# Patient Record
Sex: MALE | Race: WHITE | URBAN - METROPOLITAN AREA
[De-identification: names, ages, dates, MRNs, and addresses within clinical notes are randomized per-mention and may not be internally consistent; named-entity substitution may affect disease eponyms.]

---

## 2024-10-08 ENCOUNTER — HOSPITAL ENCOUNTER (EMERGENCY)
Age: 54
Discharge: LEFT BEFORE DISCHARGE | End: 2024-10-08

## 2024-10-08 VITALS
RESPIRATION RATE: 16 BRPM | HEIGHT: 69 IN | TEMPERATURE: 97.5 F | WEIGHT: 240 LBS | SYSTOLIC BLOOD PRESSURE: 198 MMHG | OXYGEN SATURATION: 96 % | DIASTOLIC BLOOD PRESSURE: 99 MMHG | BODY MASS INDEX: 35.55 KG/M2 | HEART RATE: 86 BPM

## 2024-10-08 DIAGNOSIS — R10.9 LEFT FLANK PAIN: Primary | ICD-10-CM

## 2024-10-08 PROCEDURE — 99281 EMR DPT VST MAYX REQ PHY/QHP: CPT

## 2024-10-08 RX ORDER — 0.9 % SODIUM CHLORIDE 0.9 %
1000 INTRAVENOUS SOLUTION INTRAVENOUS ONCE
Status: DISCONTINUED | OUTPATIENT
Start: 2024-10-08 | End: 2024-10-08 | Stop reason: HOSPADM

## 2024-10-08 RX ORDER — OXYCODONE AND ACETAMINOPHEN 5; 325 MG/1; MG/1
1 TABLET ORAL ONCE
Status: DISCONTINUED | OUTPATIENT
Start: 2024-10-08 | End: 2024-10-08 | Stop reason: HOSPADM

## 2024-10-08 RX ORDER — ONDANSETRON 2 MG/ML
4 INJECTION INTRAMUSCULAR; INTRAVENOUS ONCE
Status: DISCONTINUED | OUTPATIENT
Start: 2024-10-08 | End: 2024-10-08 | Stop reason: HOSPADM

## 2024-10-08 RX ORDER — KETOROLAC TROMETHAMINE 30 MG/ML
15 INJECTION, SOLUTION INTRAMUSCULAR; INTRAVENOUS ONCE
Status: DISCONTINUED | OUTPATIENT
Start: 2024-10-08 | End: 2024-10-08 | Stop reason: HOSPADM

## 2024-10-08 ASSESSMENT — LIFESTYLE VARIABLES
HOW OFTEN DO YOU HAVE A DRINK CONTAINING ALCOHOL: NEVER
HOW MANY STANDARD DRINKS CONTAINING ALCOHOL DO YOU HAVE ON A TYPICAL DAY: PATIENT DOES NOT DRINK

## 2024-10-08 NOTE — ED PROVIDER NOTES
Independent  HPI:  10/8/24, Time: 7:23 PM EDT         Leander Ahmadi is a 54 y.o. male presenting to the ED for left flank pain.  Patient presents to emergency department states that on Saturday he began to have left flank pain.  Reports high concern for kidney stone.  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  Patient is concerned because now he only has 1 kidney.  He states that on Saturday he did have nausea and vomiting.  He reports that then Sunday he began to feel a little bit better but then Monday once again symptoms are reoccurring.  He states the pain is primarily left flank traveling down the left lumbar and not quite into the abdomen yet.  He does report feeling very nauseous.  No blood noted in his urine.  He does report feeling feverish but no documented temperature.  Patient denies chest pain denies shortness of breath.    Review of Systems:   A complete review of systems was performed and pertinent positives and negatives are stated within HPI, all other systems reviewed and are negative.          --------------------------------------------- PAST HISTORY ---------------------------------------------  Past Medical History:  has no past medical history on file.    Past Surgical History:  has no past surgical history on file.    Social History:      Family History: family history is not on file.     The patient’s home medications have been reviewed.    Allergies: Patient has no allergy information on record.    -------------------------------------------------- RESULTS -------------------------------------------------  All laboratory and radiology results have been personally reviewed by myself   LABS:  No results found for this visit on 10/08/24.    RADIOLOGY:  Interpreted by Radiologist.  CT ABDOMEN PELVIS WO CONTRAST Additional Contrast? None    (Results Pending)       ------------------------- NURSING

## 2024-10-09 ENCOUNTER — HOSPITAL ENCOUNTER (INPATIENT)
Facility: HOSPITAL | Age: 54
LOS: 1 days | Discharge: HOME | End: 2024-10-10
Attending: EMERGENCY MEDICINE | Admitting: FAMILY MEDICINE
Payer: COMMERCIAL

## 2024-10-09 ENCOUNTER — APPOINTMENT (OUTPATIENT)
Dept: RADIOLOGY | Facility: HOSPITAL | Age: 54
End: 2024-10-09
Payer: COMMERCIAL

## 2024-10-09 ENCOUNTER — ANESTHESIA EVENT (OUTPATIENT)
Dept: OPERATING ROOM | Facility: HOSPITAL | Age: 54
End: 2024-10-09
Payer: COMMERCIAL

## 2024-10-09 ENCOUNTER — APPOINTMENT (OUTPATIENT)
Dept: CARDIOLOGY | Facility: HOSPITAL | Age: 54
End: 2024-10-09
Payer: COMMERCIAL

## 2024-10-09 ENCOUNTER — ANESTHESIA (OUTPATIENT)
Dept: OPERATING ROOM | Facility: HOSPITAL | Age: 54
End: 2024-10-09
Payer: COMMERCIAL

## 2024-10-09 DIAGNOSIS — N17.9 AKI (ACUTE KIDNEY INJURY) (CMS-HCC): ICD-10-CM

## 2024-10-09 DIAGNOSIS — I10 PRIMARY HYPERTENSION: ICD-10-CM

## 2024-10-09 DIAGNOSIS — R10.9 FLANK PAIN: Primary | ICD-10-CM

## 2024-10-09 DIAGNOSIS — N20.0 KIDNEY STONE ON LEFT SIDE: ICD-10-CM

## 2024-10-09 DIAGNOSIS — N20.1 LEFT URETERAL STONE: ICD-10-CM

## 2024-10-09 DIAGNOSIS — I10 ACCELERATED HYPERTENSION: ICD-10-CM

## 2024-10-09 LAB
ANION GAP SERPL CALC-SCNC: 12 MMOL/L (ref 10–20)
ANION GAP SERPL CALC-SCNC: 13 MMOL/L (ref 10–20)
APPEARANCE UR: CLEAR
ATRIAL RATE: 72 BPM
BASOPHILS # BLD AUTO: 0.06 X10*3/UL (ref 0–0.1)
BASOPHILS NFR BLD AUTO: 0.6 %
BILIRUB UR STRIP.AUTO-MCNC: NEGATIVE MG/DL
BUN SERPL-MCNC: 19 MG/DL (ref 6–23)
BUN SERPL-MCNC: 20 MG/DL (ref 6–23)
CALCIUM SERPL-MCNC: 8.7 MG/DL (ref 8.6–10.3)
CALCIUM SERPL-MCNC: 9.3 MG/DL (ref 8.6–10.3)
CHLORIDE SERPL-SCNC: 100 MMOL/L (ref 98–107)
CHLORIDE SERPL-SCNC: 106 MMOL/L (ref 98–107)
CO2 SERPL-SCNC: 22 MMOL/L (ref 21–32)
CO2 SERPL-SCNC: 29 MMOL/L (ref 21–32)
COLOR UR: ABNORMAL
CREAT SERPL-MCNC: 2.17 MG/DL (ref 0.5–1.3)
CREAT SERPL-MCNC: 2.17 MG/DL (ref 0.5–1.3)
EGFRCR SERPLBLD CKD-EPI 2021: 35 ML/MIN/1.73M*2
EGFRCR SERPLBLD CKD-EPI 2021: 35 ML/MIN/1.73M*2
EOSINOPHIL # BLD AUTO: 0.09 X10*3/UL (ref 0–0.7)
EOSINOPHIL NFR BLD AUTO: 0.9 %
ERYTHROCYTE [DISTWIDTH] IN BLOOD BY AUTOMATED COUNT: 12.2 % (ref 11.5–14.5)
GLUCOSE SERPL-MCNC: 150 MG/DL (ref 74–99)
GLUCOSE SERPL-MCNC: 96 MG/DL (ref 74–99)
GLUCOSE UR STRIP.AUTO-MCNC: NORMAL MG/DL
HCT VFR BLD AUTO: 46.5 % (ref 41–52)
HGB BLD-MCNC: 16 G/DL (ref 13.5–17.5)
HOLD SPECIMEN: NORMAL
IMM GRANULOCYTES # BLD AUTO: 0.04 X10*3/UL (ref 0–0.7)
IMM GRANULOCYTES NFR BLD AUTO: 0.4 % (ref 0–0.9)
KETONES UR STRIP.AUTO-MCNC: NEGATIVE MG/DL
LEUKOCYTE ESTERASE UR QL STRIP.AUTO: NEGATIVE
LYMPHOCYTES # BLD AUTO: 1.08 X10*3/UL (ref 1.2–4.8)
LYMPHOCYTES NFR BLD AUTO: 11.1 %
MCH RBC QN AUTO: 31.7 PG (ref 26–34)
MCHC RBC AUTO-ENTMCNC: 34.4 G/DL (ref 32–36)
MCV RBC AUTO: 92 FL (ref 80–100)
MONOCYTES # BLD AUTO: 0.68 X10*3/UL (ref 0.1–1)
MONOCYTES NFR BLD AUTO: 7 %
MUCOUS THREADS #/AREA URNS AUTO: ABNORMAL /LPF
NEUTROPHILS # BLD AUTO: 7.74 X10*3/UL (ref 1.2–7.7)
NEUTROPHILS NFR BLD AUTO: 80 %
NITRITE UR QL STRIP.AUTO: NEGATIVE
NRBC BLD-RTO: 0 /100 WBCS (ref 0–0)
P AXIS: -3 DEGREES
P OFFSET: 213 MS
P ONSET: 161 MS
PH UR STRIP.AUTO: 6.5 [PH]
PLATELET # BLD AUTO: 297 X10*3/UL (ref 150–450)
POTASSIUM SERPL-SCNC: 4.1 MMOL/L (ref 3.5–5.3)
POTASSIUM SERPL-SCNC: 4.5 MMOL/L (ref 3.5–5.3)
PR INTERVAL: 128 MS
PROT UR STRIP.AUTO-MCNC: NEGATIVE MG/DL
Q ONSET: 225 MS
QRS COUNT: 12 BEATS
QRS DURATION: 76 MS
QT INTERVAL: 406 MS
QTC CALCULATION(BAZETT): 444 MS
QTC FREDERICIA: 431 MS
R AXIS: 5 DEGREES
RBC # BLD AUTO: 5.04 X10*6/UL (ref 4.5–5.9)
RBC # UR STRIP.AUTO: ABNORMAL /UL
RBC #/AREA URNS AUTO: >20 /HPF
SODIUM SERPL-SCNC: 136 MMOL/L (ref 136–145)
SODIUM SERPL-SCNC: 137 MMOL/L (ref 136–145)
SP GR UR STRIP.AUTO: 1.02
T AXIS: 187 DEGREES
T OFFSET: 428 MS
UROBILINOGEN UR STRIP.AUTO-MCNC: NORMAL MG/DL
VENTRICULAR RATE: 72 BPM
WBC # BLD AUTO: 9.7 X10*3/UL (ref 4.4–11.3)
WBC #/AREA URNS AUTO: ABNORMAL /HPF

## 2024-10-09 PROCEDURE — 96361 HYDRATE IV INFUSION ADD-ON: CPT

## 2024-10-09 PROCEDURE — 7100000001 HC RECOVERY ROOM TIME - INITIAL BASE CHARGE: Performed by: STUDENT IN AN ORGANIZED HEALTH CARE EDUCATION/TRAINING PROGRAM

## 2024-10-09 PROCEDURE — 36415 COLL VENOUS BLD VENIPUNCTURE: CPT | Performed by: FAMILY MEDICINE

## 2024-10-09 PROCEDURE — 2500000004 HC RX 250 GENERAL PHARMACY W/ HCPCS (ALT 636 FOR OP/ED): Performed by: FAMILY MEDICINE

## 2024-10-09 PROCEDURE — 2500000004 HC RX 250 GENERAL PHARMACY W/ HCPCS (ALT 636 FOR OP/ED): Performed by: NURSE ANESTHETIST, CERTIFIED REGISTERED

## 2024-10-09 PROCEDURE — 74176 CT ABD & PELVIS W/O CONTRAST: CPT | Performed by: RADIOLOGY

## 2024-10-09 PROCEDURE — 96375 TX/PRO/DX INJ NEW DRUG ADDON: CPT

## 2024-10-09 PROCEDURE — C1769 GUIDE WIRE: HCPCS | Performed by: STUDENT IN AN ORGANIZED HEALTH CARE EDUCATION/TRAINING PROGRAM

## 2024-10-09 PROCEDURE — 81001 URINALYSIS AUTO W/SCOPE: CPT | Performed by: EMERGENCY MEDICINE

## 2024-10-09 PROCEDURE — 2550000001 HC RX 255 CONTRASTS: Performed by: STUDENT IN AN ORGANIZED HEALTH CARE EDUCATION/TRAINING PROGRAM

## 2024-10-09 PROCEDURE — 36415 COLL VENOUS BLD VENIPUNCTURE: CPT | Performed by: EMERGENCY MEDICINE

## 2024-10-09 PROCEDURE — 74420 UROGRAPHY RTRGR +-KUB: CPT

## 2024-10-09 PROCEDURE — 0T778DZ DILATION OF LEFT URETER WITH INTRALUMINAL DEVICE, VIA NATURAL OR ARTIFICIAL OPENING ENDOSCOPIC: ICD-10-PCS | Performed by: STUDENT IN AN ORGANIZED HEALTH CARE EDUCATION/TRAINING PROGRAM

## 2024-10-09 PROCEDURE — 2500000001 HC RX 250 WO HCPCS SELF ADMINISTERED DRUGS (ALT 637 FOR MEDICARE OP): Performed by: FAMILY MEDICINE

## 2024-10-09 PROCEDURE — 3600000003 HC OR TIME - INITIAL BASE CHARGE - PROCEDURE LEVEL THREE: Performed by: STUDENT IN AN ORGANIZED HEALTH CARE EDUCATION/TRAINING PROGRAM

## 2024-10-09 PROCEDURE — 99222 1ST HOSP IP/OBS MODERATE 55: CPT | Performed by: FAMILY MEDICINE

## 2024-10-09 PROCEDURE — 2500000001 HC RX 250 WO HCPCS SELF ADMINISTERED DRUGS (ALT 637 FOR MEDICARE OP)

## 2024-10-09 PROCEDURE — 82374 ASSAY BLOOD CARBON DIOXIDE: CPT | Performed by: FAMILY MEDICINE

## 2024-10-09 PROCEDURE — 3600000008 HC OR TIME - EACH INCREMENTAL 1 MINUTE - PROCEDURE LEVEL THREE: Performed by: STUDENT IN AN ORGANIZED HEALTH CARE EDUCATION/TRAINING PROGRAM

## 2024-10-09 PROCEDURE — 96376 TX/PRO/DX INJ SAME DRUG ADON: CPT

## 2024-10-09 PROCEDURE — C2617 STENT, NON-COR, TEM W/O DEL: HCPCS | Performed by: STUDENT IN AN ORGANIZED HEALTH CARE EDUCATION/TRAINING PROGRAM

## 2024-10-09 PROCEDURE — 52352 CYSTOURETERO W/STONE REMOVE: CPT | Performed by: STUDENT IN AN ORGANIZED HEALTH CARE EDUCATION/TRAINING PROGRAM

## 2024-10-09 PROCEDURE — 74176 CT ABD & PELVIS W/O CONTRAST: CPT

## 2024-10-09 PROCEDURE — 83036 HEMOGLOBIN GLYCOSYLATED A1C: CPT | Mod: GEALAB | Performed by: FAMILY MEDICINE

## 2024-10-09 PROCEDURE — 96374 THER/PROPH/DIAG INJ IV PUSH: CPT | Mod: 59

## 2024-10-09 PROCEDURE — 2720000007 HC OR 272 NO HCPCS: Performed by: STUDENT IN AN ORGANIZED HEALTH CARE EDUCATION/TRAINING PROGRAM

## 2024-10-09 PROCEDURE — 2500000004 HC RX 250 GENERAL PHARMACY W/ HCPCS (ALT 636 FOR OP/ED): Performed by: EMERGENCY MEDICINE

## 2024-10-09 PROCEDURE — 7100000002 HC RECOVERY ROOM TIME - EACH INCREMENTAL 1 MINUTE: Performed by: STUDENT IN AN ORGANIZED HEALTH CARE EDUCATION/TRAINING PROGRAM

## 2024-10-09 PROCEDURE — 99222 1ST HOSP IP/OBS MODERATE 55: CPT | Performed by: NURSE PRACTITIONER

## 2024-10-09 PROCEDURE — 1100000001 HC PRIVATE ROOM DAILY

## 2024-10-09 PROCEDURE — 80048 BASIC METABOLIC PNL TOTAL CA: CPT | Performed by: EMERGENCY MEDICINE

## 2024-10-09 PROCEDURE — 3700000001 HC GENERAL ANESTHESIA TIME - INITIAL BASE CHARGE: Performed by: STUDENT IN AN ORGANIZED HEALTH CARE EDUCATION/TRAINING PROGRAM

## 2024-10-09 PROCEDURE — 2500000004 HC RX 250 GENERAL PHARMACY W/ HCPCS (ALT 636 FOR OP/ED)

## 2024-10-09 PROCEDURE — 85025 COMPLETE CBC W/AUTO DIFF WBC: CPT | Performed by: EMERGENCY MEDICINE

## 2024-10-09 PROCEDURE — 74420 UROGRAPHY RTRGR +-KUB: CPT | Performed by: STUDENT IN AN ORGANIZED HEALTH CARE EDUCATION/TRAINING PROGRAM

## 2024-10-09 PROCEDURE — 99285 EMERGENCY DEPT VISIT HI MDM: CPT | Mod: 25

## 2024-10-09 PROCEDURE — 93005 ELECTROCARDIOGRAM TRACING: CPT

## 2024-10-09 PROCEDURE — 99222 1ST HOSP IP/OBS MODERATE 55: CPT | Performed by: STUDENT IN AN ORGANIZED HEALTH CARE EDUCATION/TRAINING PROGRAM

## 2024-10-09 PROCEDURE — 52332 CYSTOSCOPY AND TREATMENT: CPT | Performed by: STUDENT IN AN ORGANIZED HEALTH CARE EDUCATION/TRAINING PROGRAM

## 2024-10-09 PROCEDURE — 0TC78ZZ EXTIRPATION OF MATTER FROM LEFT URETER, VIA NATURAL OR ARTIFICIAL OPENING ENDOSCOPIC: ICD-10-PCS | Performed by: STUDENT IN AN ORGANIZED HEALTH CARE EDUCATION/TRAINING PROGRAM

## 2024-10-09 PROCEDURE — 2500000005 HC RX 250 GENERAL PHARMACY W/O HCPCS: Performed by: EMERGENCY MEDICINE

## 2024-10-09 PROCEDURE — 3700000002 HC GENERAL ANESTHESIA TIME - EACH INCREMENTAL 1 MINUTE: Performed by: STUDENT IN AN ORGANIZED HEALTH CARE EDUCATION/TRAINING PROGRAM

## 2024-10-09 PROCEDURE — 2780000003 HC OR 278 NO HCPCS: Performed by: STUDENT IN AN ORGANIZED HEALTH CARE EDUCATION/TRAINING PROGRAM

## 2024-10-09 PROCEDURE — BT1F1ZZ FLUOROSCOPY OF LEFT KIDNEY, URETER AND BLADDER USING LOW OSMOLAR CONTRAST: ICD-10-PCS | Performed by: STUDENT IN AN ORGANIZED HEALTH CARE EDUCATION/TRAINING PROGRAM

## 2024-10-09 DEVICE — STENT KIT, IMAJIN HYDRO, 6FR X 26CM, POSITIONER, NO GUIDEWIRE: Type: IMPLANTABLE DEVICE | Site: URETER | Status: FUNCTIONAL

## 2024-10-09 RX ORDER — CEFAZOLIN 1 G/1
INJECTION, POWDER, FOR SOLUTION INTRAVENOUS AS NEEDED
Status: DISCONTINUED | OUTPATIENT
Start: 2024-10-09 | End: 2024-10-09

## 2024-10-09 RX ORDER — TALC
3 POWDER (GRAM) TOPICAL NIGHTLY PRN
Status: DISCONTINUED | OUTPATIENT
Start: 2024-10-09 | End: 2024-10-10 | Stop reason: HOSPADM

## 2024-10-09 RX ORDER — KETOROLAC TROMETHAMINE 15 MG/ML
15 INJECTION, SOLUTION INTRAMUSCULAR; INTRAVENOUS ONCE
Status: COMPLETED | OUTPATIENT
Start: 2024-10-09 | End: 2024-10-09

## 2024-10-09 RX ORDER — PROPOFOL 10 MG/ML
INJECTION, EMULSION INTRAVENOUS CONTINUOUS PRN
Status: DISCONTINUED | OUTPATIENT
Start: 2024-10-09 | End: 2024-10-09

## 2024-10-09 RX ORDER — ENALAPRILAT 1.25 MG/ML
1.25 INJECTION INTRAVENOUS ONCE
Status: COMPLETED | OUTPATIENT
Start: 2024-10-09 | End: 2024-10-09

## 2024-10-09 RX ORDER — MIDAZOLAM HYDROCHLORIDE 1 MG/ML
INJECTION INTRAMUSCULAR; INTRAVENOUS CONTINUOUS PRN
Status: DISCONTINUED | OUTPATIENT
Start: 2024-10-09 | End: 2024-10-09

## 2024-10-09 RX ORDER — AMLODIPINE BESYLATE 10 MG/1
10 TABLET ORAL DAILY
Status: DISCONTINUED | OUTPATIENT
Start: 2024-10-10 | End: 2024-10-10 | Stop reason: HOSPADM

## 2024-10-09 RX ORDER — ACETAMINOPHEN 325 MG/1
650 TABLET ORAL EVERY 6 HOURS PRN
Status: DISCONTINUED | OUTPATIENT
Start: 2024-10-09 | End: 2024-10-10 | Stop reason: HOSPADM

## 2024-10-09 RX ORDER — ACETAMINOPHEN 160 MG/5ML
650 SOLUTION ORAL EVERY 6 HOURS PRN
Status: DISCONTINUED | OUTPATIENT
Start: 2024-10-09 | End: 2024-10-10 | Stop reason: HOSPADM

## 2024-10-09 RX ORDER — ONDANSETRON HYDROCHLORIDE 2 MG/ML
4 INJECTION, SOLUTION INTRAVENOUS EVERY 8 HOURS PRN
Status: DISCONTINUED | OUTPATIENT
Start: 2024-10-09 | End: 2024-10-10 | Stop reason: HOSPADM

## 2024-10-09 RX ORDER — HEPARIN SODIUM 5000 [USP'U]/ML
5000 INJECTION, SOLUTION INTRAVENOUS; SUBCUTANEOUS EVERY 8 HOURS SCHEDULED
Status: DISCONTINUED | OUTPATIENT
Start: 2024-10-09 | End: 2024-10-10 | Stop reason: HOSPADM

## 2024-10-09 RX ORDER — ALUMINUM HYDROXIDE, MAGNESIUM HYDROXIDE, AND SIMETHICONE 1200; 120; 1200 MG/30ML; MG/30ML; MG/30ML
30 SUSPENSION ORAL EVERY 6 HOURS PRN
Status: DISCONTINUED | OUTPATIENT
Start: 2024-10-09 | End: 2024-10-10 | Stop reason: HOSPADM

## 2024-10-09 RX ORDER — ACETAMINOPHEN 650 MG/1
650 SUPPOSITORY RECTAL EVERY 6 HOURS PRN
Status: DISCONTINUED | OUTPATIENT
Start: 2024-10-09 | End: 2024-10-10 | Stop reason: HOSPADM

## 2024-10-09 RX ORDER — ONDANSETRON 4 MG/1
4 TABLET, ORALLY DISINTEGRATING ORAL EVERY 8 HOURS PRN
Status: DISCONTINUED | OUTPATIENT
Start: 2024-10-09 | End: 2024-10-10 | Stop reason: HOSPADM

## 2024-10-09 RX ORDER — LIDOCAINE HCL/PF 100 MG/5ML
SYRINGE (ML) INTRAVENOUS AS NEEDED
Status: DISCONTINUED | OUTPATIENT
Start: 2024-10-09 | End: 2024-10-09

## 2024-10-09 RX ORDER — DOCUSATE SODIUM 100 MG/1
100 CAPSULE, LIQUID FILLED ORAL 2 TIMES DAILY
Status: DISCONTINUED | OUTPATIENT
Start: 2024-10-09 | End: 2024-10-10 | Stop reason: HOSPADM

## 2024-10-09 RX ORDER — ATENOLOL 25 MG/1
12.5 TABLET ORAL DAILY
Status: DISCONTINUED | OUTPATIENT
Start: 2024-10-09 | End: 2024-10-10 | Stop reason: HOSPADM

## 2024-10-09 RX ORDER — FENTANYL CITRATE 50 UG/ML
INJECTION, SOLUTION INTRAMUSCULAR; INTRAVENOUS CONTINUOUS PRN
Status: DISCONTINUED | OUTPATIENT
Start: 2024-10-09 | End: 2024-10-09

## 2024-10-09 RX ORDER — AMLODIPINE BESYLATE 5 MG/1
5 TABLET ORAL ONCE
Status: COMPLETED | OUTPATIENT
Start: 2024-10-09 | End: 2024-10-09

## 2024-10-09 RX ORDER — SODIUM CHLORIDE 9 MG/ML
75 INJECTION, SOLUTION INTRAVENOUS CONTINUOUS
Status: DISCONTINUED | OUTPATIENT
Start: 2024-10-09 | End: 2024-10-10

## 2024-10-09 RX ORDER — ACETAMINOPHEN 500 MG
1000 TABLET ORAL EVERY 6 HOURS PRN
COMMUNITY

## 2024-10-09 RX ORDER — AMLODIPINE BESYLATE 5 MG/1
5 TABLET ORAL DAILY
Status: DISCONTINUED | OUTPATIENT
Start: 2024-10-09 | End: 2024-10-09

## 2024-10-09 SDOH — ECONOMIC STABILITY: TRANSPORTATION INSECURITY: IN THE PAST 12 MONTHS, HAS LACK OF TRANSPORTATION KEPT YOU FROM MEDICAL APPOINTMENTS OR FROM GETTING MEDICATIONS?: NO

## 2024-10-09 SDOH — SOCIAL STABILITY: SOCIAL INSECURITY: WITHIN THE LAST YEAR, HAVE YOU BEEN HUMILIATED OR EMOTIONALLY ABUSED IN OTHER WAYS BY YOUR PARTNER OR EX-PARTNER?: NO

## 2024-10-09 SDOH — ECONOMIC STABILITY: FOOD INSECURITY: WITHIN THE PAST 12 MONTHS, THE FOOD YOU BOUGHT JUST DIDN'T LAST AND YOU DIDN'T HAVE MONEY TO GET MORE.: NEVER TRUE

## 2024-10-09 SDOH — SOCIAL STABILITY: SOCIAL INSECURITY: DO YOU FEEL ANYONE HAS EXPLOITED OR TAKEN ADVANTAGE OF YOU FINANCIALLY OR OF YOUR PERSONAL PROPERTY?: NO

## 2024-10-09 SDOH — ECONOMIC STABILITY: HOUSING INSECURITY: AT ANY TIME IN THE PAST 12 MONTHS, WERE YOU HOMELESS OR LIVING IN A SHELTER (INCLUDING NOW)?: NO

## 2024-10-09 SDOH — SOCIAL STABILITY: SOCIAL INSECURITY
WITHIN THE LAST YEAR, HAVE YOU BEEN KICKED, HIT, SLAPPED, OR OTHERWISE PHYSICALLY HURT BY YOUR PARTNER OR EX-PARTNER?: NO

## 2024-10-09 SDOH — SOCIAL STABILITY: SOCIAL INSECURITY
WITHIN THE LAST YEAR, HAVE TO BEEN RAPED OR FORCED TO HAVE ANY KIND OF SEXUAL ACTIVITY BY YOUR PARTNER OR EX-PARTNER?: NO

## 2024-10-09 SDOH — ECONOMIC STABILITY: FOOD INSECURITY

## 2024-10-09 SDOH — SOCIAL STABILITY: SOCIAL INSECURITY: DO YOU FEEL UNSAFE GOING BACK TO THE PLACE WHERE YOU ARE LIVING?: NO

## 2024-10-09 SDOH — ECONOMIC STABILITY: HOUSING INSECURITY: IN THE LAST 12 MONTHS, WAS THERE A TIME WHEN YOU WERE NOT ABLE TO PAY THE MORTGAGE OR RENT ON TIME?: NO

## 2024-10-09 SDOH — ECONOMIC STABILITY: INCOME INSECURITY: IN THE LAST 12 MONTHS, WAS THERE A TIME WHEN YOU WERE NOT ABLE TO PAY THE MORTGAGE OR RENT ON TIME?: NO

## 2024-10-09 SDOH — SOCIAL STABILITY: SOCIAL INSECURITY: WITHIN THE LAST YEAR, HAVE YOU BEEN AFRAID OF YOUR PARTNER OR EX-PARTNER?: NO

## 2024-10-09 SDOH — SOCIAL STABILITY: SOCIAL INSECURITY: ABUSE: ADULT

## 2024-10-09 SDOH — ECONOMIC STABILITY: FOOD INSECURITY: WITHIN THE PAST 12 MONTHS, YOU WORRIED THAT YOUR FOOD WOULD RUN OUT BEFORE YOU GOT MONEY TO BUY MORE.: NEVER TRUE

## 2024-10-09 SDOH — SOCIAL STABILITY: SOCIAL INSECURITY: HAVE YOU HAD THOUGHTS OF HARMING ANYONE ELSE?: NO

## 2024-10-09 SDOH — ECONOMIC STABILITY: TRANSPORTATION INSECURITY
IN THE PAST 12 MONTHS, HAS LACK OF TRANSPORTATION KEPT YOU FROM MEETINGS, WORK, OR FROM GETTING THINGS NEEDED FOR DAILY LIVING?: NO

## 2024-10-09 SDOH — SOCIAL STABILITY: SOCIAL INSECURITY: HAS ANYONE EVER THREATENED TO HURT YOUR FAMILY OR YOUR PETS?: NO

## 2024-10-09 SDOH — ECONOMIC STABILITY: HOUSING INSECURITY

## 2024-10-09 SDOH — ECONOMIC STABILITY: HOUSING INSECURITY: IN THE PAST 12 MONTHS HAS THE ELECTRIC, GAS, OIL, OR WATER COMPANY THREATENED TO SHUT OFF SERVICES IN YOUR HOME?: NO

## 2024-10-09 SDOH — SOCIAL STABILITY: SOCIAL INSECURITY: ARE YOU OR HAVE YOU BEEN THREATENED OR ABUSED PHYSICALLY, EMOTIONALLY, OR SEXUALLY BY ANYONE?: NO

## 2024-10-09 SDOH — ECONOMIC STABILITY: FOOD INSECURITY: WITHIN THE PAST 12 MONTHS, YOU WORRIED THAT YOUR FOOD WOULD RUN OUT BEFORE YOU GOT THE MONEY TO BUY MORE.: NEVER TRUE

## 2024-10-09 SDOH — SOCIAL STABILITY: SOCIAL INSECURITY
WITHIN THE LAST YEAR, HAVE YOU BEEN RAPED OR FORCED TO HAVE ANY KIND OF SEXUAL ACTIVITY BY YOUR PARTNER OR EX-PARTNER?: NO

## 2024-10-09 SDOH — ECONOMIC STABILITY: INCOME INSECURITY: IN THE PAST 12 MONTHS, HAS THE ELECTRIC, GAS, OIL, OR WATER COMPANY THREATENED TO SHUT OFF SERVICE IN YOUR HOME?: NO

## 2024-10-09 SDOH — ECONOMIC STABILITY: FOOD INSECURITY: HOW HARD IS IT FOR YOU TO PAY FOR THE VERY BASICS LIKE FOOD, HOUSING, MEDICAL CARE, AND HEATING?: NOT HARD AT ALL

## 2024-10-09 SDOH — SOCIAL STABILITY: SOCIAL INSECURITY: ARE THERE ANY APPARENT SIGNS OF INJURIES/BEHAVIORS THAT COULD BE RELATED TO ABUSE/NEGLECT?: NO

## 2024-10-09 SDOH — ECONOMIC STABILITY: HOUSING INSECURITY: IN THE PAST 12 MONTHS, HOW MANY TIMES HAVE YOU MOVED WHERE YOU WERE LIVING?: 1

## 2024-10-09 SDOH — ECONOMIC STABILITY: TRANSPORTATION INSECURITY
IN THE PAST 12 MONTHS, HAS THE LACK OF TRANSPORTATION KEPT YOU FROM MEDICAL APPOINTMENTS OR FROM GETTING MEDICATIONS?: NO

## 2024-10-09 SDOH — ECONOMIC STABILITY: TRANSPORTATION INSECURITY

## 2024-10-09 SDOH — ECONOMIC STABILITY: INCOME INSECURITY: IN THE PAST 12 MONTHS HAS THE ELECTRIC, GAS, OIL, OR WATER COMPANY THREATENED TO SHUT OFF SERVICES IN YOUR HOME?: NO

## 2024-10-09 SDOH — ECONOMIC STABILITY: HOUSING INSECURITY
IN THE LAST 12 MONTHS, WAS THERE A TIME WHEN YOU DID NOT HAVE A STEADY PLACE TO SLEEP OR SLEPT IN A SHELTER (INCLUDING NOW)?: NO

## 2024-10-09 SDOH — SOCIAL STABILITY: SOCIAL INSECURITY: WERE YOU ABLE TO COMPLETE ALL THE BEHAVIORAL HEALTH SCREENINGS?: YES

## 2024-10-09 SDOH — ECONOMIC STABILITY: HOUSING INSECURITY: IN THE LAST 12 MONTHS, HOW MANY PLACES HAVE YOU LIVED?: 1

## 2024-10-09 SDOH — SOCIAL STABILITY: SOCIAL INSECURITY: HAVE YOU HAD ANY THOUGHTS OF HARMING ANYONE ELSE?: NO

## 2024-10-09 SDOH — ECONOMIC STABILITY: INCOME INSECURITY: HOW HARD IS IT FOR YOU TO PAY FOR THE VERY BASICS LIKE FOOD, HOUSING, MEDICAL CARE, AND HEATING?: NOT HARD AT ALL

## 2024-10-09 SDOH — SOCIAL STABILITY: SOCIAL INSECURITY: DOES ANYONE TRY TO KEEP YOU FROM HAVING/CONTACTING OTHER FRIENDS OR DOING THINGS OUTSIDE YOUR HOME?: NO

## 2024-10-09 SDOH — ECONOMIC STABILITY: GENERAL

## 2024-10-09 ASSESSMENT — PAIN SCALES - GENERAL
PAINLEVEL_OUTOF10: 0 - NO PAIN
PAINLEVEL_OUTOF10: 5 - MODERATE PAIN
PAINLEVEL_OUTOF10: 0 - NO PAIN
PAINLEVEL_OUTOF10: 0 - NO PAIN
PAINLEVEL_OUTOF10: 1
PAINLEVEL_OUTOF10: 0 - NO PAIN
PAINLEVEL_OUTOF10: 7
PAINLEVEL_OUTOF10: 0 - NO PAIN
PAIN_LEVEL: 0
PAINLEVEL_OUTOF10: 0 - NO PAIN
PAINLEVEL_OUTOF10: 0 - NO PAIN

## 2024-10-09 ASSESSMENT — ENCOUNTER SYMPTOMS
CONSTIPATION: 0
FLANK PAIN: 1
SHORTNESS OF BREATH: 0
CHEST TIGHTNESS: 0
VOMITING: 0
ABDOMINAL PAIN: 0
CHILLS: 1
NAUSEA: 0
HEADACHES: 0
DYSURIA: 1
PALPITATIONS: 0
BACK PAIN: 1
FEVER: 1
DIARRHEA: 0

## 2024-10-09 ASSESSMENT — COGNITIVE AND FUNCTIONAL STATUS - GENERAL
MOBILITY SCORE: 24
DAILY ACTIVITIY SCORE: 24
PATIENT BASELINE BEDBOUND: NO

## 2024-10-09 ASSESSMENT — LIFESTYLE VARIABLES
HOW MANY STANDARD DRINKS CONTAINING ALCOHOL DO YOU HAVE ON A TYPICAL DAY: PATIENT DOES NOT DRINK
EVER FELT BAD OR GUILTY ABOUT YOUR DRINKING: NO
HOW OFTEN DO YOU HAVE 6 OR MORE DRINKS ON ONE OCCASION: NEVER
HAVE YOU EVER FELT YOU SHOULD CUT DOWN ON YOUR DRINKING: NO
TOTAL SCORE: 0
HOW OFTEN DO YOU HAVE A DRINK CONTAINING ALCOHOL: NEVER
SKIP TO QUESTIONS 9-10: 1
EVER HAD A DRINK FIRST THING IN THE MORNING TO STEADY YOUR NERVES TO GET RID OF A HANGOVER: NO
AUDIT-C TOTAL SCORE: 0
HAVE PEOPLE ANNOYED YOU BY CRITICIZING YOUR DRINKING: NO
AUDIT-C TOTAL SCORE: 0

## 2024-10-09 ASSESSMENT — ACTIVITIES OF DAILY LIVING (ADL)
HEARING - RIGHT EAR: FUNCTIONAL
ADEQUATE_TO_COMPLETE_ADL: NO
DRESSING YOURSELF: INDEPENDENT
TOILETING: INDEPENDENT
WALKS IN HOME: INDEPENDENT
ASSISTIVE_DEVICE: EYEGLASSES
GROOMING: INDEPENDENT
HEARING - LEFT EAR: FUNCTIONAL
LACK_OF_TRANSPORTATION: NO
LACK_OF_TRANSPORTATION: NO
FEEDING YOURSELF: INDEPENDENT
JUDGMENT_ADEQUATE_SAFELY_COMPLETE_DAILY_ACTIVITIES: NO
PATIENT'S MEMORY ADEQUATE TO SAFELY COMPLETE DAILY ACTIVITIES?: NO
LACK_OF_TRANSPORTATION: NO
BATHING: INDEPENDENT

## 2024-10-09 ASSESSMENT — PAIN - FUNCTIONAL ASSESSMENT
PAIN_FUNCTIONAL_ASSESSMENT: 0-10

## 2024-10-09 ASSESSMENT — COLUMBIA-SUICIDE SEVERITY RATING SCALE - C-SSRS
2. HAVE YOU ACTUALLY HAD ANY THOUGHTS OF KILLING YOURSELF?: NO
1. IN THE PAST MONTH, HAVE YOU WISHED YOU WERE DEAD OR WISHED YOU COULD GO TO SLEEP AND NOT WAKE UP?: NO
6. HAVE YOU EVER DONE ANYTHING, STARTED TO DO ANYTHING, OR PREPARED TO DO ANYTHING TO END YOUR LIFE?: NO

## 2024-10-09 ASSESSMENT — PATIENT HEALTH QUESTIONNAIRE - PHQ9
1. LITTLE INTEREST OR PLEASURE IN DOING THINGS: NOT AT ALL
2. FEELING DOWN, DEPRESSED OR HOPELESS: NOT AT ALL
SUM OF ALL RESPONSES TO PHQ9 QUESTIONS 1 & 2: 0

## 2024-10-09 ASSESSMENT — PAIN DESCRIPTION - LOCATION: LOCATION: BACK

## 2024-10-09 ASSESSMENT — SOCIAL DETERMINANTS OF HEALTH (SDOH): IN THE PAST 12 MONTHS, HAS THE ELECTRIC, GAS, OIL, OR WATER COMPANY THREATENED TO SHUT OFF SERVICE IN YOUR HOME?: NO

## 2024-10-09 ASSESSMENT — PAIN DESCRIPTION - PAIN TYPE: TYPE: ACUTE PAIN

## 2024-10-09 ASSESSMENT — PAIN DESCRIPTION - ORIENTATION: ORIENTATION: LEFT

## 2024-10-09 NOTE — ED TRIAGE NOTES
Pt here for L flank pain since sat. Hx 3 kidney stones in the past that were passed naturally. Hx R kidney removal in the early 2000s per pt. Endorses ELAINA.

## 2024-10-09 NOTE — OP NOTE
Ureteroscopy with Extraction Calculus, Stent Insertion, Retrograde Pyelogram (L) Operative Note     Date: 10/9/2024  OR Location: [facility] OR    Name: Jonatan Salas : 1970, Age: 54 y.o., MRN: [id number], Sex: male    Diagnosis  Pre-op Diagnosis      * Left ureteral stone [N20.1] Post-op Diagnosis     * Left ureteral stone [N20.1]     Procedures  05911 - Ureteroscopy with Extraction Calculus  20412 - Double J Stent Insertion  68281 -  Retrograde Pyelogram       Surgeons      * Selam Chang - Primary    Resident/Fellow/Other Assistant:  Surgeons and Role:  * No surgeons found with a matching role *    Procedure Summary  Anesthesia: General LMA  ASA: ASA status not filed in the log.  Anesthesia Staff: Anesthesiologist: Jalil Weeks DO  CRNA: JESSICA Abraham-CRNA  Estimated Blood Loss: 0mL  Intra-op Medications:   Administrations occurring from 1750 to 1855 on 10/09/24:   Medication Name Total Dose   amLODIPine (Norvasc) tablet 10 mg Cannot be calculated   atenolol (Tenormin) tablet 12.5 mg Cannot be calculated              Anesthesia Record               Intraprocedure I/O Totals       None           Specimen: No specimens collected     Staff:   Scrub Person: Jenny  Circulator: Shirley GOLDSMITHA: Deann         Drains and/or Catheters:   Ureteral Drain/Stent Left ureter 6 Fr. (Active)       Tourniquet Times:         Implants:  Implants       Type Name Action Serial No.      Implant STENT KIT, IMAJIN HYDRO, 6FR X 26CM, POSITIONER, NO GUIDEWIRE - [id number] Implanted               Findings: distal ureteral narrowing and inflammation and 3mm just proximal to it, no hydropnephrosis    Indications: Jonatan Salas is an 54 y.o. male who is having surgery for Left renal calculi with hydronephrosis. Solitary left kidney, unknown baseline kidney function, creatinine of 2.17 today    The patient was seen in the preoperative area. The risks, benefits, complications, treatment options, non-operative alternatives,  expected recovery and outcomes were discussed with the patient. The possibilities of reaction to medication, pulmonary aspiration, injury to surrounding structures, bleeding, recurrent infection, the need for additional procedures, failure to diagnose a condition, and creating a complication requiring transfusion or operation were discussed with the patient. The patient concurred with the proposed plan, giving informed consent.  The site of surgery was properly noted/marked if necessary per policy. The patient has been actively warmed in preoperative area. Preoperative antibiotics have been ordered and given within 1 hours of incision. Venous thrombosis prophylaxis have been ordered including bilateral sequential compression devices    Procedure Details: Patient was consented and antibiotics were started on call to OR.  In the operating room, under general anesthesia, with the patient in dorsolithotomy position, genitalia was prepped and draped in the usual manner.  No.22 cystoscope was inserted down the urethra into the bladder, and cystoscopy revealed no stones or tumors. The ureteral orifices were identified in the normal orthotopic position.  The ureteral catheter was advanced through the cystoscope and a guidewire was inserted through the left ureteral orifice.  The ureteral catheter and the cystoscope were removed and the rigid ureteroscope was inserted parallel to the wire into the left ureter, and advanced to the level of the distal-ureter where ureteral narrowing and inflammation and 3mm just proximal to it.  Using the basket, the stone was extracted.    Contrast was injected through the ureteroscope visualizing the ureter and the renal pelvis.  There was hydroureter but no hydronephrosis.    Then the cystoscope was reinserted over the wire and 6-26 double-J stent was advanced over the wire, with the proximal curl of visualized in the renal pelvis topography using fluoroscopy, while the distal curl was  visualized in the bladder.    The bladder was emptied and the stone was sent for qualitative analysis.  This concluded the procedure.    Patient tolerated the procedure well and was transferred to PACU in stable condition.    Complications:  None; patient tolerated the procedure well.    Disposition: PACU - hemodynamically stable.  Condition: stable         Additional Details:     Attending Attestation: I performed the procedure.    Selam Chang  Phone Number: [phone]

## 2024-10-09 NOTE — ED NOTES
Shanice Hernandes pt walked out of the waiting room after handing in wrist band and stating he was leaving.

## 2024-10-09 NOTE — PROGRESS NOTES
Pharmacy Medication History Review    Jonatan Salas is a 54 y.o. male admitted for Flank pain. Pharmacy reviewed the patient's [id number] medications and allergies for accuracy.    The list below reflectives the updated PTA list. Please review each medication in order reconciliation for additional clarification and justification.  Prior to Admission Medications   Prescriptions Last Dose Informant Patient Reported? Taking?   acetaminophen (Tylenol) 500 mg tablet 10/9/2024 at 04:00 Self Yes Yes   Sig: Take 2 tablets (1,000 mg) by mouth every 6 hours if needed for mild pain (1 - 3).      Facility-Administered Medications: None           The list below reflectives the updated allergy list. Please review each documented allergy for additional clarification and justification.  Allergies  Reviewed by Chiqui Mcrae RN on 10/9/2024        Severity Reactions Comments    Penicillins Not Specified Unknown     Amoxicillin Low Rash             Below are additional concerns with the patient's PTA list.      Marielena Manzanares

## 2024-10-09 NOTE — CONSULTS
Reason For Consult  Left ureteral calculus     History Of Present Illness  Jonatan Salas is a 54 y.o. male with a past medical history of right nephrectomy due to malignant neoplasm in 2005 presenting with left flank pain that started on Saturday.  States pain has been intermittent but intensified this morning prompting him to be evaluated in the ED.  CT scan in the ED demonstrated left ureter calculus with mild hydronephrosis.  He does have an RADHA (unknown baseline creatinine).  Patient does not have a family doctor and has not had blood work in an unknown period of time.   Urology was consulted for evaluation.      Patient reports pain is 7/10.  Intermittent nausea.  Denies fever, chills, dysuria.       Past Medical History  He has a past medical history of Accelerated hypertension (10/09/2024), RADHA (acute kidney injury) (CMS-HCC) (10/09/2024), Left nephrolithiasis, and Renal cell carcinoma of right kidney (12/30/2005).    Surgical History  He has a past surgical history that includes Nephrectomy (Right, 12/30/2005) and Colonoscopy.     Social History  He has no history on file for tobacco use, alcohol use, and drug use.    Family History  No family history on file.     Allergies  Penicillins and Amoxicillin    Review of Systems  12 point ROS completed and no additional findings noted aside from what is listed in the HPI     Physical Exam  Physical Exam  Vitals reviewed.   Constitutional:       Appearance: Normal appearance.   HENT:      Head: Normocephalic and atraumatic.   Eyes:      Conjunctiva/sclera: Conjunctivae normal.      Pupils: Pupils are equal, round, and reactive to light.   Cardiovascular:      Rate and Rhythm: Normal rate and regular rhythm.      Pulses: Normal pulses.   Pulmonary:      Effort: Pulmonary effort is normal.   Abdominal:      Palpations: Abdomen is soft.      Tenderness: There is no left CVA tenderness.   Musculoskeletal:      Cervical back: Neck supple.   Skin:     General: Skin is  "warm and dry.      Capillary Refill: Capillary refill takes less than 2 seconds.   Neurological:      General: No focal deficit present.      Mental Status: He is alert and oriented to person, place, and time.   Psychiatric:         Mood and Affect: Mood normal.         Behavior: Behavior normal.         Thought Content: Thought content normal.         Judgment: Judgment normal.       Last Recorded Vitals  Blood pressure (!) 168/100, pulse 104, temperature 36.3 °C (97.3 °F), temperature source Temporal, resp. rate 16, height 1.727 m (5' 8\"), weight 112 kg (247 lb 8 oz), SpO2 98%.    Relevant Results  CT abdomen pelvis wo IV contrast    Result Date: 10/9/2024  Interpreted By:  Minnie Young, STUDY: CT ABDOMEN PELVIS WO IV CONTRAST;  10/9/2024 7:13 am   INDICATION: Signs/Symptoms:left flank pain suspect stone history of right nephrectomy.   COMPARISON: None.   ACCESSION NUMBER(S): [id number]   ORDERING CLINICIAN: MURRAY GONZALEZ   TECHNIQUE: CT of the abdomen and pelvis was performed. No oral, no intravenous contrast.   FINDINGS: LOWER CHEST: Images of the lung bases show no infiltrate or pleural fluid.   ABDOMEN:   LIVER: There is no hepatic mass.   BILE DUCTS: There is no intrahepatic, common hepatic or common bile ductal dilatation.   GALLBLADDER: There is a punctate gallstone in an otherwise unremarkable gallbladder.   PANCREAS: The pancreas is unremarkable.   SPLEEN: The spleen is unremarkable. There is no splenic mass or splenomegaly.   ADRENAL GLANDS: The adrenal glands are unremarkable.   KIDNEYS AND URETERS: Status post right nephrectomy. There are left parapelvic cysts. There is a 0.3 cm distal left ureteral calculus near the ureterovesical junction. This is 1.7 cm proximal to the ureterovesical junction. There is mild left hydronephrosis. There is an additional punctate upper pole left intrarenal calculus.   BOWEL: There is no bowel wall thickening, dilatation or obstruction. The appendix is normal   " VESSELS: The abdominal and pelvic vessels are unremarkable.   PERITONEUM/RETROPERITONEUM/LYMPH NODES: There is no retroperitoneal or pelvic adenopathy.  There is no ascites.   ABDOMINAL WALL: The abdominal wall is unremarkable.   BONE AND SOFT TISSUE: There is no acute osseous finding.   There is no soft tissue abnormality.       1. 0.3 cm distal left ureteral calculus on which is 1.7 cm proximal to the left ureterovesical junction. There is mild left hydroureter. 2. Additional punctate nonobstructing upper pole left intrarenal calculus. 3. Left parapelvic cysts. 4. Status post right nephrectomy.   MACRO: None   Signed by: Minnie Young 10/9/2024 8:25 AM Dictation workstation:   [id number]     Scheduled medications  [Transfer Hold] amLODIPine, 10 mg, oral, Daily  [Transfer Hold] atenolol, 12.5 mg, oral, Daily  docusate sodium, 100 mg, oral, BID  [Held by provider] heparin (porcine), 5,000 Units, subcutaneous, q8h EDISON      Continuous medications  sodium chloride 0.9%, 75 mL/hr, Last Rate: 75 mL/hr (10/09/24 1130)      PRN medications  PRN medications: acetaminophen **OR** acetaminophen **OR** acetaminophen, alum-mag hydroxide-simeth, melatonin, ondansetron ODT **OR** ondansetron  Results for orders placed or performed during the hospital encounter of 10/09/24 (from the past 24 hour(s))   Urinalysis with Reflex Culture and Microscopic   Result Value Ref Range    Color, Urine Light-Yellow Light-Yellow, Yellow, Dark-Yellow    Appearance, Urine Clear Clear    Specific Gravity, Urine 1.016 1.005 - 1.035    pH, Urine 6.5 5.0, 5.5, 6.0, 6.5, 7.0, 7.5, 8.0    Protein, Urine NEGATIVE NEGATIVE, 10 (TRACE), 20 (TRACE) mg/dL    Glucose, Urine Normal Normal mg/dL    Blood, Urine 0.5 (2+) (A) NEGATIVE    Ketones, Urine NEGATIVE NEGATIVE mg/dL    Bilirubin, Urine NEGATIVE NEGATIVE    Urobilinogen, Urine Normal Normal mg/dL    Nitrite, Urine NEGATIVE NEGATIVE    Leukocyte Esterase, Urine NEGATIVE NEGATIVE   Extra Urine Gray Tube    Result Value Ref Range    Extra Tube Hold for add-ons.    Urinalysis Microscopic   Result Value Ref Range    WBC, Urine 1-5 1-5, NONE /HPF    RBC, Urine >20 (A) NONE, 1-2, 3-5 /HPF    Mucus, Urine FEW Reference range not established. /LPF   CBC and Auto Differential   Result Value Ref Range    WBC 9.7 4.4 - 11.3 x10*3/uL    nRBC 0.0 0.0 - 0.0 /100 WBCs    RBC 5.04 4.50 - 5.90 x10*6/uL    Hemoglobin 16.0 13.5 - 17.5 g/dL    Hematocrit 46.5 41.0 - 52.0 %    MCV 92 80 - 100 fL    MCH 31.7 26.0 - 34.0 pg    MCHC 34.4 32.0 - 36.0 g/dL    RDW 12.2 11.5 - 14.5 %    Platelets 297 150 - 450 x10*3/uL    Neutrophils % 80.0 40.0 - 80.0 %    Immature Granulocytes %, Automated 0.4 0.0 - 0.9 %    Lymphocytes % 11.1 13.0 - 44.0 %    Monocytes % 7.0 2.0 - 10.0 %    Eosinophils % 0.9 0.0 - 6.0 %    Basophils % 0.6 0.0 - 2.0 %    Neutrophils Absolute 7.74 (H) 1.20 - 7.70 x10*3/uL    Immature Granulocytes Absolute, Automated 0.04 0.00 - 0.70 x10*3/uL    Lymphocytes Absolute 1.08 (L) 1.20 - 4.80 x10*3/uL    Monocytes Absolute 0.68 0.10 - 1.00 x10*3/uL    Eosinophils Absolute 0.09 0.00 - 0.70 x10*3/uL    Basophils Absolute 0.06 0.00 - 0.10 x10*3/uL   Basic metabolic panel   Result Value Ref Range    Glucose 150 (H) 74 - 99 mg/dL    Sodium 136 136 - 145 mmol/L    Potassium 4.5 3.5 - 5.3 mmol/L    Chloride 100 98 - 107 mmol/L    Bicarbonate 29 21 - 32 mmol/L    Anion Gap 12 10 - 20 mmol/L    Urea Nitrogen 19 6 - 23 mg/dL    Creatinine 2.17 (H) 0.50 - 1.30 mg/dL    eGFR 35 (L) >60 mL/min/1.73m*2    Calcium 9.3 8.6 - 10.3 mg/dL   ECG 12 Lead   Result Value Ref Range    Ventricular Rate 72 BPM    Atrial Rate 72 BPM    IA Interval 128 ms    QRS Duration 76 ms    QT Interval 406 ms    QTC Calculation(Bazett) 444 ms    P Axis -3 degrees    R Axis 5 degrees    T Axis 187 degrees    QRS Count 12 beats    Q Onset 225 ms    P Onset 161 ms    P Offset 213 ms    T Offset 428 ms    QTC Fredericia 431 ms   Basic Metabolic Panel   Result Value Ref  Range    Glucose 96 74 - 99 mg/dL    Sodium 137 136 - 145 mmol/L    Potassium 4.1 3.5 - 5.3 mmol/L    Chloride 106 98 - 107 mmol/L    Bicarbonate 22 21 - 32 mmol/L    Anion Gap 13 10 - 20 mmol/L    Urea Nitrogen 20 6 - 23 mg/dL    Creatinine 2.17 (H) 0.50 - 1.30 mg/dL    eGFR 35 (L) >60 mL/min/1.73m*2    Calcium 8.7 8.6 - 10.3 mg/dL        Assessment/Plan   54 year old male with solitary left kidney, 3mm left ureter calculi with mild hydronephrosis and creatinine of 2.17, unknown baseline.    I personally reviewed the medical records of the patient including the note of the referring physician including the CT images as well as report.    I discussed with the patient the options of observation versus ureteroscopy to extract the stone and insert a stent. Because of his solitary kidney and the lack of known baseline creatinine, proceeding to extract the stone will help prevent further damage to the solitary kidney.    Patient would like to proceed with procedure.    Plan:  - imaging and labs reviewed  - NPO for the OR today for cystoscopy with stone extraction with Dr. Chang   - recheck labs in the AM  - MIVF  - hypertensive - defer to medicine  - remainder of care per primary          Selam Chang MD

## 2024-10-09 NOTE — ED PROVIDER NOTES
"HPI   Chief Complaint   Patient presents with    Flank Pain       Jonatan is a 54-year-old male who presents with complaint of left flank pain that started on Saturday.  Pain waxes and wanes and he had some vomiting at that time.  He reports pain was most in the left back flank area.  He took some Tylenol which helped a little bit did not take all the pain away.  He seemed to be doing better the next day and then the pain came back and he had some more vomiting on Sunday night.  Yesterday he had pain in the evening and it went away and came back in this morning.  He reports his pain level is currently about a 5 out of 10 and took some Tylenol earlier in the morning.  He has a history of kidney stones in the remote past and had a mass found in his right kidney which was cancer.  It was contained and removed 15 or more years ago.  He has not had stones since the kidney was removed.  He denies any fever chills cough or cold.  He is allergic to penicillin and does not smoke tobacco.  He denies current nausea.  No pain when he urinates and no blood in his urine that he was able to see.  No recent trauma.              Patient History   Past Medical History:   Diagnosis Date    H/O right nephrectomy     per pt R kidney removal \"in the early 2000s\"     No past surgical history on file.  No family history on file.  Social History     Tobacco Use    Smoking status: Not on file    Smokeless tobacco: Not on file   Substance Use Topics    Alcohol use: Not on file    Drug use: Not on file       Physical Exam   ED Triage Vitals [10/09/24 0604]   Temperature Heart Rate Respirations BP   36 °C (96.8 °F) 88 17 (!) 190/98      Pulse Ox Temp Source Heart Rate Source Patient Position   96 % Temporal Monitor Lying      BP Location FiO2 (%)     Left arm --       Physical Exam  Vitals reviewed.   Constitutional:       General: He is awake.      Appearance: Normal appearance.   HENT:      Head: Normocephalic.      Nose: Nose normal. "   Cardiovascular:      Rate and Rhythm: Normal rate and regular rhythm.   Pulmonary:      Effort: Pulmonary effort is normal.      Breath sounds: Normal breath sounds.   Abdominal:      Palpations: Abdomen is soft.      Comments: Non tender to palp no guarding or rebound. ND no rash.    Genitourinary:     Comments: Deferred  Musculoskeletal:      Cervical back: Normal range of motion.   Skin:     General: Skin is warm.      Capillary Refill: Capillary refill takes less than 2 seconds.   Neurological:      Mental Status: He is alert.           ED Course & MDM   ED Course as of 10/09/24 0908   Wed Oct 09, 2024   0719 Patient was found to have some renal sufficiency and unable to find previous renal function labs in the EMR.  I talked the patient about the findings and he will be given a bag of IV fluids.  Awaiting CT results patient appears comfortable no acute distress. [RZ]   0906 Patient's pain improved.  His blood pressure remained high he was given some medication which brought his blood pressure down little bit and then went back up again he was given another dose of enalapril for that.  Concerned that he has renal insufficiency with an RADHA along with his kidney stone and only 1 kidney he we hospitalize for further evaluation and treatment.  I spoke to Dr. Chang who wants him n.p.o. for now.  Spoke to the hospitalist.  Patient is stable for hospitalization. [RZ]      ED Course User Index  [RZ] Jonatan Strong MD         Diagnoses as of 10/09/24 0908   Flank pain   RADHA (acute kidney injury) (CMS-[facility])   Accelerated hypertension   Kidney stone on left side                 No data recorded     [place] Coma Scale Score: 15 (10/09/24 0606 : Chiqui Mcrae RN)                           Medical Decision Making      Procedure  Procedures     Jonatan Strong MD  10/09/24 0908

## 2024-10-09 NOTE — H&P
History Of Present Illness  Jonatan Salas is a 54 y.o. male with PMH of RCC s/p right nephrectomy and renal stones that presented to the ED with left flank pain. Patient states his symptoms started 4 days ago with flank pain and then vomiting and chills. He states he took tylenol for him symptoms which initially worked but it just got worse so he came to the ED. Patient states he has not been to the doctor since his nephrectomy and has not been on any medications.    In the ED, vital signs showed elevated BP but were otherwise within normal limits. Laboratory analysis revealed hyperglycemia and abnormal kidney function. Case discussed with urology who recommended admission for cystoscopy later today. Patient was treated with IV enalaprilat, toradol, and IV fluids.     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   HENT:  Negative for congestion.    Eyes:  Negative for visual disturbance.   Respiratory:  Negative for chest tightness and shortness of breath.    Cardiovascular:  Negative for chest pain and palpitations.   Gastrointestinal:  Negative for abdominal pain, constipation, diarrhea, nausea and vomiting.   Genitourinary:  Positive for dysuria and flank pain.   Musculoskeletal:  Positive for back pain.   Skin:  Negative for rash.   Neurological:  Negative for headaches.        Physical Exam  Constitutional: alert and oriented x 3, awake, cooperative, no acute distress  Skin: warm and dry  Head/Neck: Normocephalic, atraumatic  Eyes: clear sclera  ENMT: mucous membranes moist  Cardio: Regular rate and rhythm  Resp: CTA bilaterally, good  respiratory effort  Gastrointestinal: Soft, nontender, nondistended  Genitourinary: left CVA tenderness with palpation  Musculoskeletal: ROM intact, no joint swelling  Extremities: No edema, cyanosis, or clubbing  Neuro: alert and oriented x 3  Psychological: Appropriate mood and behavior    Last Recorded Vitals  BP (!) 170/105 Comment: nurse aware  Pulse 72   Temp 36.8 °C (98.2 °F) (Temporal)   Resp 18   Wt 112 kg (247 lb 8 oz)   SpO2 96%     Relevant Results  Scheduled medications  [START ON 10/10/2024] amLODIPine, 10 mg, oral, Daily  docusate sodium, 100 mg, oral, BID  [Held by provider] heparin (porcine), 5,000 Units, subcutaneous, q8h EDISON      Continuous medications  sodium chloride 0.9%, 75 mL/hr, Last Rate: 75 mL/hr (10/09/24 1130)      PRN medications  PRN medications: acetaminophen **OR** acetaminophen **OR** acetaminophen, alum-mag hydroxide-simeth, melatonin, ondansetron ODT **OR** ondansetron    Results for orders placed or performed during the hospital encounter of 10/09/24 (from the past 24 hour(s))   Urinalysis with Reflex Culture and Microscopic   Result Value Ref Range    Color, Urine Light-Yellow Light-Yellow, Yellow, Dark-Yellow    Appearance, Urine Clear Clear    Specific Gravity, Urine 1.016 1.005 - 1.035    pH, Urine 6.5 5.0, 5.5, 6.0, 6.5, 7.0, 7.5, 8.0    Protein, Urine NEGATIVE NEGATIVE, 10 (TRACE), 20 (TRACE) mg/dL    Glucose, Urine Normal Normal mg/dL    Blood, Urine 0.5 (2+) (A) NEGATIVE    Ketones, Urine NEGATIVE NEGATIVE mg/dL    Bilirubin, Urine NEGATIVE NEGATIVE    Urobilinogen, Urine Normal Normal mg/dL    Nitrite, Urine NEGATIVE NEGATIVE    Leukocyte Esterase, Urine NEGATIVE NEGATIVE   Urinalysis Microscopic   Result Value Ref Range    WBC, Urine 1-5 1-5, NONE /HPF    RBC, Urine >20 (A) NONE, 1-2, 3-5 /HPF    Mucus, Urine FEW Reference range not established. /LPF   CBC and Auto Differential   Result Value Ref Range    WBC 9.7 4.4 - 11.3 x10*3/uL    nRBC 0.0 0.0 - 0.0 /100 WBCs     RBC 5.04 4.50 - 5.90 x10*6/uL    Hemoglobin 16.0 13.5 - 17.5 g/dL    Hematocrit 46.5 41.0 - 52.0 %    MCV 92 80 - 100 fL    MCH 31.7 26.0 - 34.0 pg    MCHC 34.4 32.0 - 36.0 g/dL    RDW 12.2 11.5 - 14.5 %    Platelets 297 150 - 450 x10*3/uL    Neutrophils % 80.0 40.0 - 80.0 %    Immature Granulocytes %, Automated 0.4 0.0 - 0.9 %    Lymphocytes % 11.1 13.0 - 44.0 %    Monocytes % 7.0 2.0 - 10.0 %    Eosinophils % 0.9 0.0 - 6.0 %    Basophils % 0.6 0.0 - 2.0 %    Neutrophils Absolute 7.74 (H) 1.20 - 7.70 x10*3/uL    Immature Granulocytes Absolute, Automated 0.04 0.00 - 0.70 x10*3/uL    Lymphocytes Absolute 1.08 (L) 1.20 - 4.80 x10*3/uL    Monocytes Absolute 0.68 0.10 - 1.00 x10*3/uL    Eosinophils Absolute 0.09 0.00 - 0.70 x10*3/uL    Basophils Absolute 0.06 0.00 - 0.10 x10*3/uL   Basic metabolic panel   Result Value Ref Range    Glucose 150 (H) 74 - 99 mg/dL    Sodium 136 136 - 145 mmol/L    Potassium 4.5 3.5 - 5.3 mmol/L    Chloride 100 98 - 107 mmol/L    Bicarbonate 29 21 - 32 mmol/L    Anion Gap 12 10 - 20 mmol/L    Urea Nitrogen 19 6 - 23 mg/dL    Creatinine 2.17 (H) 0.50 - 1.30 mg/dL    eGFR 35 (L) >60 mL/min/1.73m*2    Calcium 9.3 8.6 - 10.3 mg/dL     CT abdomen pelvis wo IV contrast    Result Date: 10/9/2024  Interpreted By:  Minnie Young, STUDY: CT ABDOMEN PELVIS WO IV CONTRAST;  10/9/2024 7:13 am   INDICATION: Signs/Symptoms:left flank pain suspect stone history of right nephrectomy.   COMPARISON: None.   ACCESSION NUMBER(S): [id number]   ORDERING CLINICIAN: MURRAY GONZALEZ   TECHNIQUE: CT of the abdomen and pelvis was performed. No oral, no intravenous contrast.   FINDINGS: LOWER CHEST: Images of the lung bases show no infiltrate or pleural fluid.   ABDOMEN:   LIVER: There is no hepatic mass.   BILE DUCTS: There is no intrahepatic, common hepatic or common bile ductal dilatation.   GALLBLADDER: There is a punctate gallstone in an otherwise unremarkable gallbladder.   PANCREAS: The pancreas is  unremarkable.   SPLEEN: The spleen is unremarkable. There is no splenic mass or splenomegaly.   ADRENAL GLANDS: The adrenal glands are unremarkable.   KIDNEYS AND URETERS: Status post right nephrectomy. There are left parapelvic cysts. There is a 0.3 cm distal left ureteral calculus near the ureterovesical junction. This is 1.7 cm proximal to the ureterovesical junction. There is mild left hydronephrosis. There is an additional punctate upper pole left intrarenal calculus.   BOWEL: There is no bowel wall thickening, dilatation or obstruction. The appendix is normal   VESSELS: The abdominal and pelvic vessels are unremarkable.   PERITONEUM/RETROPERITONEUM/LYMPH NODES: There is no retroperitoneal or pelvic adenopathy.  There is no ascites.   ABDOMINAL WALL: The abdominal wall is unremarkable.   BONE AND SOFT TISSUE: There is no acute osseous finding.   There is no soft tissue abnormality.       1. 0.3 cm distal left ureteral calculus on which is 1.7 cm proximal to the left ureterovesical junction. There is mild left hydroureter. 2. Additional punctate nonobstructing upper pole left intrarenal calculus. 3. Left parapelvic cysts. 4. Status post right nephrectomy.   MACRO: None   Signed by: Minnie Young 10/9/2024 8:25 AM Dictation workstation:   [id number]       Assessment & Plan    53yo CM with PMH of RCC s/p right nephrectomy and renal stones that presented to the ED with left flank pain and is admitted for abnormal kidney function with left ureter calculi.    Left ureter calculi with mild hydronephrosis  - confirmed on admitting CT in ED  - urology consulted, plan for cystoscopy with stone extraction later today  - NPO for procedure, then can have regular diet  - continue to monitor    Elevated BP  - no hx of diagnosed hypertension but does not see any PCP  - start amlodipine 10mg every day  - consider nephro consult if still elevated tomorrow    Abnormal kidney function  - unclear of baseline as patient has not  been to PCP since nephrectomy  - s/p 1L NS bolus, start maintenance fluids  - recheck BMP this afternoon for further interventions  - consider renal ultrasound if no improvement    Hyperglycemia  - check HbA1c     DVT Proph: heparin subcutaneous    Dispo: Patient requires close inpatient monitoring in setting of RADHA and ureter calculi requiring surgical intervention and IV fluids, hospital stay likely >2midnights.         Randi Cantrell, DO

## 2024-10-09 NOTE — CARE PLAN
Problem: Pain  Goal: Takes deep breaths with improved pain control throughout the shift  Outcome: Progressing     Patient's pain has been controlled. Patient had high blood pressure but has come down after interventions. Patient sent to OR for kidney stone.

## 2024-10-09 NOTE — ANESTHESIA PREPROCEDURE EVALUATION
Patient: Jonatan Salas    Procedure Information       Anesthesia Start Date/Time: 10/09/24 1440    Procedure: Ureteroscopy with Extraction Calculus, Stent Insertion, Retrograde Pyelogram (Left)    Location: [facility] / [facility]    Surgeons: Selam Chang MD            Relevant Problems   Cardiac   (+) Primary hypertension      /Renal   (+) Renal calculi       Clinical information reviewed:    Allergies  Meds               NPO Detail:  No data recorded     Physical Exam    Airway  Mallampati: II  TM distance: >3 FB  Neck ROM: full     Cardiovascular   Rhythm: regular  Rate: normal     Dental - normal exam     Pulmonary - normal exam     Abdominal            Anesthesia Plan    History of general anesthesia?: yes  History of complications of general anesthesia?: no    ASA 3     general     Anesthetic plan and risks discussed with patient.    Plan discussed with attending.

## 2024-10-09 NOTE — PROGRESS NOTES
10/09/24 0930   Discharge Planning   Living Arrangements Spouse/significant other  (Home with DAMON Nicolas)   Support Systems Spouse/significant other   Assistance Needed A&OX4; independent with ADLs with no DME; drives; room air baseline and currently room air; patient with history of renal CA and Hx or right nephrectomy   Type of Residence Private residence   Number of Stairs to Enter Residence 2   Number of Stairs Within Residence 20  (10 up & 10 down)   Do you have animals or pets at home? Yes   Type of Animals or Pets 1 cat   Who is requesting discharge planning? Provider   Home or Post Acute Services None   Expected Discharge Disposition Home  (TBD pending urology but likely home no needs)   Does the patient need discharge transport arranged? No   Financial Resource Strain   How hard is it for you to pay for the very basics like food, housing, medical care, and heating? Not hard   Housing Stability   In the last 12 months, was there a time when you were not able to pay the mortgage or rent on time? N   In the past 12 months, how many times have you moved where you were living? 1   At any time in the past 12 months, were you homeless or living in a shelter (including now)? N   Transportation Needs   In the past 12 months, has lack of transportation kept you from medical appointments or from getting medications? no   In the past 12 months, has lack of transportation kept you from meetings, work, or from getting things needed for daily living? No     10/09/2024 0933am  Spoke with patient bedside in ED   Urology Attending Progress Note      Subjective: Feeling better. Nausea which she attributes to medicine    Vitals:  /79   Pulse 83   Temp 98.7 °F (37.1 °C) (Oral)   Resp 16   Ht 5' 4\" (1.626 m)   Wt 227 lb 8.2 oz (103.2 kg)   LMP 2018   SpO2 98%   BMI 39.05 kg/m²   Temp  Av.7 °F (37.1 °C)  Min: 98.6 °F (37 °C)  Max: 98.7 °F (37.1 °C)    Intake/Output Summary (Last 24 hours) at 3/26/2019 1600  Last data filed at 3/26/2019 0354  Gross per 24 hour   Intake 360 ml   Output 900 ml   Net -540 ml       Exam: unchanged    Labs:  WBC:    Lab Results   Component Value Date    WBC 8.2 2019     Hemoglobin/Hematocrit:    Lab Results   Component Value Date    HGB 8.8 2019    HCT 28.0 2019     BMP:    Lab Results   Component Value Date     2019    K 3.6 2019    K 3.7 10/21/2018     2019    CO2 23 2019    BUN 4 2019    LABALBU 2.9 2019    CREATININE 0.6 2019    CALCIUM 8.6 2019    GFRAA >60 2019    LABGLOM >60 2019     PT/INR:    Lab Results   Component Value Date    PROTIME 11.6 2019    INR 1.02 2019     PTT:  No results found for: APTT[APTT        Urine Culture:  <25,000 CFU    Imaging: renal ultrasound - resolved hydro, minimal real pelvis dilation      Impression/Plan: s/p left ureteral injury during hysterectomy - stent for 3 months - f/u ultrasound appears that this may have been successful to avoid reimplant surgery. Urine culture negative. OK for discharge per . Will have pt f/u 1 last time in office for final imaging.      Angella Maya MD

## 2024-10-10 ENCOUNTER — PHARMACY VISIT (OUTPATIENT)
Dept: PHARMACY | Facility: CLINIC | Age: 54
End: 2024-10-10
Payer: MEDICARE

## 2024-10-10 VITALS
TEMPERATURE: 97.2 F | HEIGHT: 68 IN | OXYGEN SATURATION: 96 % | HEART RATE: 69 BPM | DIASTOLIC BLOOD PRESSURE: 99 MMHG | BODY MASS INDEX: 37.51 KG/M2 | WEIGHT: 247.5 LBS | SYSTOLIC BLOOD PRESSURE: 183 MMHG | RESPIRATION RATE: 16 BRPM

## 2024-10-10 LAB
ANION GAP SERPL CALC-SCNC: 14 MMOL/L (ref 10–20)
BUN SERPL-MCNC: 22 MG/DL (ref 6–23)
CALCIUM SERPL-MCNC: 8.2 MG/DL (ref 8.6–10.3)
CHLORIDE SERPL-SCNC: 105 MMOL/L (ref 98–107)
CO2 SERPL-SCNC: 22 MMOL/L (ref 21–32)
CREAT SERPL-MCNC: 1.72 MG/DL (ref 0.5–1.3)
EGFRCR SERPLBLD CKD-EPI 2021: 47 ML/MIN/1.73M*2
ERYTHROCYTE [DISTWIDTH] IN BLOOD BY AUTOMATED COUNT: 11.9 % (ref 11.5–14.5)
EST. AVERAGE GLUCOSE BLD GHB EST-MCNC: 108 MG/DL
GLUCOSE SERPL-MCNC: 164 MG/DL (ref 74–99)
HBA1C MFR BLD: 5.4 %
HCT VFR BLD AUTO: 41.1 % (ref 41–52)
HGB BLD-MCNC: 13.8 G/DL (ref 13.5–17.5)
MCH RBC QN AUTO: 31.7 PG (ref 26–34)
MCHC RBC AUTO-ENTMCNC: 33.6 G/DL (ref 32–36)
MCV RBC AUTO: 95 FL (ref 80–100)
NRBC BLD-RTO: 0 /100 WBCS (ref 0–0)
PLATELET # BLD AUTO: 268 X10*3/UL (ref 150–450)
POTASSIUM SERPL-SCNC: 4.7 MMOL/L (ref 3.5–5.3)
RBC # BLD AUTO: 4.35 X10*6/UL (ref 4.5–5.9)
SODIUM SERPL-SCNC: 136 MMOL/L (ref 136–145)
WBC # BLD AUTO: 6.8 X10*3/UL (ref 4.4–11.3)

## 2024-10-10 PROCEDURE — 85027 COMPLETE CBC AUTOMATED: CPT

## 2024-10-10 PROCEDURE — 80048 BASIC METABOLIC PNL TOTAL CA: CPT

## 2024-10-10 PROCEDURE — 99239 HOSP IP/OBS DSCHRG MGMT >30: CPT | Performed by: FAMILY MEDICINE

## 2024-10-10 PROCEDURE — 2500000004 HC RX 250 GENERAL PHARMACY W/ HCPCS (ALT 636 FOR OP/ED)

## 2024-10-10 PROCEDURE — RXMED WILLOW AMBULATORY MEDICATION CHARGE

## 2024-10-10 PROCEDURE — 36415 COLL VENOUS BLD VENIPUNCTURE: CPT

## 2024-10-10 PROCEDURE — 2500000001 HC RX 250 WO HCPCS SELF ADMINISTERED DRUGS (ALT 637 FOR MEDICARE OP)

## 2024-10-10 PROCEDURE — 99232 SBSQ HOSP IP/OBS MODERATE 35: CPT | Performed by: NURSE PRACTITIONER

## 2024-10-10 RX ORDER — AMLODIPINE BESYLATE 10 MG/1
10 TABLET ORAL DAILY
Qty: 30 TABLET | Refills: 0 | Status: SHIPPED | OUTPATIENT
Start: 2024-10-11 | End: 2024-11-10

## 2024-10-10 RX ORDER — ATENOLOL 25 MG/1
12.5 TABLET ORAL DAILY
Qty: 15 TABLET | Refills: 0 | Status: SHIPPED | OUTPATIENT
Start: 2024-10-11 | End: 2024-11-10

## 2024-10-10 ASSESSMENT — COGNITIVE AND FUNCTIONAL STATUS - GENERAL
DAILY ACTIVITIY SCORE: 24
MOBILITY SCORE: 24

## 2024-10-10 ASSESSMENT — PAIN SCALES - GENERAL: PAINLEVEL_OUTOF10: 0 - NO PAIN

## 2024-10-10 ASSESSMENT — PAIN - FUNCTIONAL ASSESSMENT: PAIN_FUNCTIONAL_ASSESSMENT: 0-10

## 2024-10-10 NOTE — CARE PLAN
Problem: Pain  Goal: Turns in bed with improved pain control throughout the shift  Outcome: Progressing  Goal: Participates in PT with improved pain control throughout the shift  Outcome: Progressing     Problem: Safety - Adult  Goal: Free from fall injury  Outcome: Progressing   The patient's goals for the shift include decreased pain      The clinical goals for the shift include patient will have decreased pain by the end of the shift

## 2024-10-10 NOTE — DISCHARGE SUMMARY
Discharge Diagnosis  Left ureter calculi  Hypertension  RADHA vs CKD    Issues Requiring Follow-Up  PCP follow up to establish care for HTN, single kidney  Urology follow up for ureteral calculi with stent placement in 2 weeks    Discharge Meds     Medication List      START taking these medications     amLODIPine 10 mg tablet; Commonly known as: Norvasc; Take 1 tablet (10   mg) by mouth once daily.; Start taking on: October 11, 2024   atenolol 25 mg tablet; Commonly known as: Tenormin; Take 0.5 tablets   (12.5 mg) by mouth once daily.; Start taking on: October 11, 2024     CONTINUE taking these medications     acetaminophen 500 mg tablet; Commonly known as: Tylenol       Test Results Pending At Discharge  Pending Labs       No current pending labs.            Hospital Course  53yo CM with PMH of RCC s/p right nephrectomy and renal stones that presented to the ED with left flank pain and was admitted for RADHA in setting of left ureteral calculi. Patient was seen by urology who performed cystoscopy with stent placement on 10/9 with improvement of symptoms. Patient was treated with IV fluids and had improvement of his kidney function, I suspect he has underlying CKD in setting of single kidney and uncontrolled BP. Patient was started on amlodipine and atenolol which showed improvement of his BP. Patient appears medically stable for discharge with urology follow up as well as new appointment with PCP do continue treatment of HTN and likely CKD.    Medically cleared for discharge. Medications reviewed and reconciled. Scripts prepared as needed.  Discussed with the family, , and . Questions answered. Understanding verbalized. Overall time spent on discharge was longer than 35 min.      Pertinent Physical Exam At Time of Discharge  Constitutional: alert and oriented x 3, awake, cooperative, no acute distress  Skin: warm and dry  Head/Neck: Normocephalic, atraumatic  Eyes: clear sclera  ENMT: mucous  membranes moist  Cardio: Regular rate and rhythm  Resp: CTA bilaterally, good respiratory effort  Gastrointestinal: Soft, nontender, nondistended  Musculoskeletal: ROM intact, no joint swelling  Extremities: No edema, cyanosis, or clubbing  Neuro: alert and oriented x 3  Psychological: Appropriate mood and behavior      Outpatient Follow-Up  Future Appointments   Date Time Provider Department Center   10/31/2024  1:00 PM Selam Chang MD [id number]   11/5/2024  2:00 PM Deann Amaya MD [id number] [facility]         Randi Canrtell DO

## 2024-10-10 NOTE — NURSING NOTE
Discharge instructions reviewed with patient. No questions at this time. Education given for new homegoing medications with type, uses, and most common side effects. Patient verbalized understanding. Handout on cystoscopy given. IV removed. Discharged home in stable condition.

## 2024-10-10 NOTE — CARE PLAN
Problem: Pain  Goal: Walks with improved pain control throughout the shift  Outcome: Progressing     Problem: Safety - Adult  Goal: Free from fall injury  Outcome: Progressing      Patient doing well with no pain. Patient only complaint is pain with urination. Patient planning for discharge.

## 2024-10-10 NOTE — ANESTHESIA POSTPROCEDURE EVALUATION
Patient: Jonatan Salas    Procedure Summary       Date: 10/09/24 Room / Location: [facility] / [facility]    Anesthesia Start: 1850 Anesthesia Stop: 2000    Procedure: Ureteroscopy with Extraction Calculus, Stent Insertion, Retrograde Pyelogram (Left) Diagnosis:       Left ureteral stone      (Left renal calculi with hydronephrosis)    Surgeons: Selam Chang MD Responsible Provider: Jalil Weeks DO    Anesthesia Type: general ASA Status: 3            Anesthesia Type: general    Vitals Value Taken Time   /90 10/09/24 2001   Temp 36.2 10/09/24 2001   Pulse 73 10/09/24 2001   Resp 18 10/09/24 2001   SpO2 95 10/09/24 2001       Anesthesia Post Evaluation    Patient location during evaluation: PACU  Patient participation: complete - patient participated  Level of consciousness: awake and alert  Pain score: 0  Pain management: satisfactory to patient  Multimodal analgesia pain management approach  Airway patency: patent  Two or more strategies used to mitigate risk of obstructive sleep apnea  Cardiovascular status: acceptable  Respiratory status: acceptable  Hydration status: acceptable  Postoperative Nausea and Vomiting: none        There were no known notable events for this encounter.

## 2024-10-10 NOTE — DISCHARGE INSTR - APPOINTMENTS
[facility] hospital follow up appointment with Dr Deann Chaudhry at [facility] scheduled for November 5th, 2020 at 2pm

## 2024-10-10 NOTE — PROGRESS NOTES
10/10/24 1000   Discharge Planning   Expected Discharge Disposition Home  (Pt dcing today and is fine with dc. Patient denies home going needs. Meds to beds prior to dc. New PCP Dr Amaya at [facility] for 11/5/24 2pm scheduled)

## 2024-10-10 NOTE — PROGRESS NOTES
"Jonatan Salas is a 54 y.o. male on day 1 of admission presenting with Flank pain.    Subjective   No acute overnight events.  Pain controlled.  Overall feeling better.        Objective     Physical Exam  Vitals reviewed.   Constitutional:       Appearance: Normal appearance.   HENT:      Head: Normocephalic and atraumatic.   Eyes:      Conjunctiva/sclera: Conjunctivae normal.      Pupils: Pupils are equal, round, and reactive to light.   Cardiovascular:      Rate and Rhythm: Normal rate and regular rhythm.      Pulses: Normal pulses.   Pulmonary:      Effort: Pulmonary effort is normal.   Abdominal:      Palpations: Abdomen is soft.      Tenderness: There is no right CVA tenderness or left CVA tenderness.   Musculoskeletal:      Cervical back: Neck supple.   Skin:     General: Skin is warm and dry.      Capillary Refill: Capillary refill takes less than 2 seconds.   Neurological:      General: No focal deficit present.      Mental Status: He is alert and oriented to person, place, and time.   Psychiatric:         Mood and Affect: Mood normal.         Behavior: Behavior normal.         Thought Content: Thought content normal.         Judgment: Judgment normal.         Last Recorded Vitals  Blood pressure 149/84, pulse 73, temperature 36.2 °C (97.2 °F), resp. rate 16, height 1.727 m (5' 8\"), weight 112 kg (247 lb 8 oz), SpO2 96%.  Intake/Output last 3 Shifts:  I/O last 3 completed shifts:  In: 0 (0 mL/kg)   Out: 275 (2.4 mL/kg) [Urine:275 (0.1 mL/kg/hr)]  Weight: 112.3 kg     Relevant Results  ECG 12 Lead    Result Date: 10/9/2024  Normal sinus rhythm Moderate voltage criteria for LVH, may be normal variant T wave abnormality, consider inferior ischemia T wave abnormality, consider anterolateral ischemia Abnormal ECG When compared with ECG of 13-AUG-1993 15:44, T wave inversion now evident in Anterolateral leads    CT abdomen pelvis wo IV contrast    Result Date: 10/9/2024  Interpreted By:  Minnie Young, STUDY: CT " ABDOMEN PELVIS WO IV CONTRAST;  10/9/2024 7:13 am   INDICATION: Signs/Symptoms:left flank pain suspect stone history of right nephrectomy.   COMPARISON: None.   ACCESSION NUMBER(S): [id number]   ORDERING CLINICIAN: MURRAY GONZALEZ   TECHNIQUE: CT of the abdomen and pelvis was performed. No oral, no intravenous contrast.   FINDINGS: LOWER CHEST: Images of the lung bases show no infiltrate or pleural fluid.   ABDOMEN:   LIVER: There is no hepatic mass.   BILE DUCTS: There is no intrahepatic, common hepatic or common bile ductal dilatation.   GALLBLADDER: There is a punctate gallstone in an otherwise unremarkable gallbladder.   PANCREAS: The pancreas is unremarkable.   SPLEEN: The spleen is unremarkable. There is no splenic mass or splenomegaly.   ADRENAL GLANDS: The adrenal glands are unremarkable.   KIDNEYS AND URETERS: Status post right nephrectomy. There are left parapelvic cysts. There is a 0.3 cm distal left ureteral calculus near the ureterovesical junction. This is 1.7 cm proximal to the ureterovesical junction. There is mild left hydronephrosis. There is an additional punctate upper pole left intrarenal calculus.   BOWEL: There is no bowel wall thickening, dilatation or obstruction. The appendix is normal   VESSELS: The abdominal and pelvic vessels are unremarkable.   PERITONEUM/RETROPERITONEUM/LYMPH NODES: There is no retroperitoneal or pelvic adenopathy.  There is no ascites.   ABDOMINAL WALL: The abdominal wall is unremarkable.   BONE AND SOFT TISSUE: There is no acute osseous finding.   There is no soft tissue abnormality.       1. 0.3 cm distal left ureteral calculus on which is 1.7 cm proximal to the left ureterovesical junction. There is mild left hydroureter. 2. Additional punctate nonobstructing upper pole left intrarenal calculus. 3. Left parapelvic cysts. 4. Status post right nephrectomy.   MACRO: None   Signed by: Minnie Young 10/9/2024 8:25 AM Dictation workstation:   [id number]      Scheduled medications  amLODIPine, 10 mg, oral, Daily  atenolol, 12.5 mg, oral, Daily  docusate sodium, 100 mg, oral, BID  heparin (porcine), 5,000 Units, subcutaneous, q8h EDISON      Continuous medications  sodium chloride 0.9%, 75 mL/hr, Last Rate: 75 mL/hr (10/09/24 2102)      PRN medications  PRN medications: acetaminophen **OR** acetaminophen **OR** acetaminophen, alum-mag hydroxide-simeth, melatonin, ondansetron ODT **OR** ondansetron  Results for orders placed or performed during the hospital encounter of 10/09/24 (from the past 24 hour(s))   CBC and Auto Differential   Result Value Ref Range    WBC 9.7 4.4 - 11.3 x10*3/uL    nRBC 0.0 0.0 - 0.0 /100 WBCs    RBC 5.04 4.50 - 5.90 x10*6/uL    Hemoglobin 16.0 13.5 - 17.5 g/dL    Hematocrit 46.5 41.0 - 52.0 %    MCV 92 80 - 100 fL    MCH 31.7 26.0 - 34.0 pg    MCHC 34.4 32.0 - 36.0 g/dL    RDW 12.2 11.5 - 14.5 %    Platelets 297 150 - 450 x10*3/uL    Neutrophils % 80.0 40.0 - 80.0 %    Immature Granulocytes %, Automated 0.4 0.0 - 0.9 %    Lymphocytes % 11.1 13.0 - 44.0 %    Monocytes % 7.0 2.0 - 10.0 %    Eosinophils % 0.9 0.0 - 6.0 %    Basophils % 0.6 0.0 - 2.0 %    Neutrophils Absolute 7.74 (H) 1.20 - 7.70 x10*3/uL    Immature Granulocytes Absolute, Automated 0.04 0.00 - 0.70 x10*3/uL    Lymphocytes Absolute 1.08 (L) 1.20 - 4.80 x10*3/uL    Monocytes Absolute 0.68 0.10 - 1.00 x10*3/uL    Eosinophils Absolute 0.09 0.00 - 0.70 x10*3/uL    Basophils Absolute 0.06 0.00 - 0.10 x10*3/uL   Basic metabolic panel   Result Value Ref Range    Glucose 150 (H) 74 - 99 mg/dL    Sodium 136 136 - 145 mmol/L    Potassium 4.5 3.5 - 5.3 mmol/L    Chloride 100 98 - 107 mmol/L    Bicarbonate 29 21 - 32 mmol/L    Anion Gap 12 10 - 20 mmol/L    Urea Nitrogen 19 6 - 23 mg/dL    Creatinine 2.17 (H) 0.50 - 1.30 mg/dL    eGFR 35 (L) >60 mL/min/1.73m*2    Calcium 9.3 8.6 - 10.3 mg/dL   Hemoglobin A1c   Result Value Ref Range    Hemoglobin A1C 5.4 See comment %    Estimated Average  Glucose 108 Not Established mg/dL   ECG 12 Lead   Result Value Ref Range    Ventricular Rate 72 BPM    Atrial Rate 72 BPM    MO Interval 128 ms    QRS Duration 76 ms    QT Interval 406 ms    QTC Calculation(Bazett) 444 ms    P Axis -3 degrees    R Axis 5 degrees    T Axis 187 degrees    QRS Count 12 beats    Q Onset 225 ms    P Onset 161 ms    P Offset 213 ms    T Offset 428 ms    QTC Fredericia 431 ms   Basic Metabolic Panel   Result Value Ref Range    Glucose 96 74 - 99 mg/dL    Sodium 137 136 - 145 mmol/L    Potassium 4.1 3.5 - 5.3 mmol/L    Chloride 106 98 - 107 mmol/L    Bicarbonate 22 21 - 32 mmol/L    Anion Gap 13 10 - 20 mmol/L    Urea Nitrogen 20 6 - 23 mg/dL    Creatinine 2.17 (H) 0.50 - 1.30 mg/dL    eGFR 35 (L) >60 mL/min/1.73m*2    Calcium 8.7 8.6 - 10.3 mg/dL       Assessment/Plan   Assessment & Plan  Flank pain    Primary hypertension    Renal calculi    54 year old male, POD 1 from cystoscopy with stone extraction, JJ stent placement     - AM labs reviewed - creatinine   - OOB ad lennox  - pain control with Tylenol PRN Oxycodone   - can have regular diet with BR  - remainder of care per primary team  - clear for DC from urologic stand point   - follow up with Dr. Chang in 2 weeks for stent removal  - recommend BMP on Monday to check creatinine         I spent 20 minutes in the professional and overall care of this patient.    Rachelle Orozco, APRN-CNP

## 2024-10-14 NOTE — SIGNIFICANT EVENT
Follow Up Phone Call    Outgoing phone call    Spoke to: Jonatan Salas Relationship:self   Phone number: [phone]      Outcome: contacted patient/ family   Chief Complaint   Patient presents with    Flank Pain          Diagnosis:Not applicable    States he is feeling better. No further questions or concerns.

## 2024-10-31 ENCOUNTER — APPOINTMENT (OUTPATIENT)
Dept: UROLOGY | Facility: CLINIC | Age: 54
End: 2024-10-31
Payer: COMMERCIAL

## 2024-10-31 DIAGNOSIS — Z96.0 URETERAL STENT PRESENT: Primary | ICD-10-CM

## 2024-10-31 PROCEDURE — 52310 CYSTOSCOPY AND TREATMENT: CPT | Performed by: STUDENT IN AN ORGANIZED HEALTH CARE EDUCATION/TRAINING PROGRAM

## 2024-11-05 ENCOUNTER — APPOINTMENT (OUTPATIENT)
Dept: PRIMARY CARE | Facility: CLINIC | Age: 54
End: 2024-11-05
Payer: COMMERCIAL

## 2024-11-05 VITALS
WEIGHT: 250 LBS | DIASTOLIC BLOOD PRESSURE: 78 MMHG | OXYGEN SATURATION: 98 % | HEART RATE: 74 BPM | BODY MASS INDEX: 38.01 KG/M2 | SYSTOLIC BLOOD PRESSURE: 126 MMHG

## 2024-11-05 DIAGNOSIS — Z13.6 SCREENING FOR CARDIOVASCULAR CONDITION: ICD-10-CM

## 2024-11-05 DIAGNOSIS — Z12.11 SCREENING FOR COLON CANCER: ICD-10-CM

## 2024-11-05 DIAGNOSIS — Z87.442 HISTORY OF KIDNEY STONES: ICD-10-CM

## 2024-11-05 DIAGNOSIS — Z85.528 HISTORY OF RENAL CELL CARCINOMA: ICD-10-CM

## 2024-11-05 DIAGNOSIS — R79.89 ELEVATED SERUM CREATININE: ICD-10-CM

## 2024-11-05 DIAGNOSIS — I10 PRIMARY HYPERTENSION: Primary | ICD-10-CM

## 2024-11-05 DIAGNOSIS — R35.1 NOCTURIA: ICD-10-CM

## 2024-11-05 PROBLEM — Z90.5 SINGLE FUNCTIONAL KIDNEY: Status: ACTIVE | Noted: 2024-11-05

## 2024-11-05 PROCEDURE — 1036F TOBACCO NON-USER: CPT | Performed by: FAMILY MEDICINE

## 2024-11-05 PROCEDURE — 99214 OFFICE O/P EST MOD 30 MIN: CPT | Performed by: FAMILY MEDICINE

## 2024-11-05 PROCEDURE — 3074F SYST BP LT 130 MM HG: CPT | Performed by: FAMILY MEDICINE

## 2024-11-05 PROCEDURE — 3078F DIAST BP <80 MM HG: CPT | Performed by: FAMILY MEDICINE

## 2024-11-05 RX ORDER — ATENOLOL 25 MG/1
12.5 TABLET ORAL DAILY
Qty: 45 TABLET | Refills: 3 | Status: SHIPPED | OUTPATIENT
Start: 2024-11-05 | End: 2025-11-05

## 2024-11-05 RX ORDER — AMLODIPINE BESYLATE 10 MG/1
10 TABLET ORAL DAILY
Qty: 90 TABLET | Refills: 3 | Status: SHIPPED | OUTPATIENT
Start: 2024-11-05 | End: 2025-11-05

## 2024-11-05 ASSESSMENT — COLUMBIA-SUICIDE SEVERITY RATING SCALE - C-SSRS
2. HAVE YOU ACTUALLY HAD ANY THOUGHTS OF KILLING YOURSELF?: NO
6. HAVE YOU EVER DONE ANYTHING, STARTED TO DO ANYTHING, OR PREPARED TO DO ANYTHING TO END YOUR LIFE?: NO
1. IN THE PAST MONTH, HAVE YOU WISHED YOU WERE DEAD OR WISHED YOU COULD GO TO SLEEP AND NOT WAKE UP?: NO

## 2024-11-05 ASSESSMENT — PATIENT HEALTH QUESTIONNAIRE - PHQ9
SUM OF ALL RESPONSES TO PHQ9 QUESTIONS 1 AND 2: 0
2. FEELING DOWN, DEPRESSED OR HOPELESS: NOT AT ALL
1. LITTLE INTEREST OR PLEASURE IN DOING THINGS: NOT AT ALL

## 2024-11-05 ASSESSMENT — ENCOUNTER SYMPTOMS
DEPRESSION: 0
OCCASIONAL FEELINGS OF UNSTEADINESS: 0
LOSS OF SENSATION IN FEET: 0

## 2024-11-05 NOTE — PROGRESS NOTES
Subjective   Patient ID: Jonatan Salas is a 54 y.o. male.    HPI  54 year old male to establish., He recently went to hospital and had kidney sotne, Admitted 10/09/2024, until 10/10/2024. Given medications for blood pressure in hospital, has been taking, readings at home have been 10/11 181/108, 10/18 146/91, 10/25 140/90 now 11/4, 138/87. Atenolol 12.5, and amlodipine 10. Had bad side effects initially on tenormin. HR is lower than normal, but no below 60, Last BMP at [facility] was 1.72, and GFR was 47. Cr 1.53, GFR 40 10/14/2024  BMP at [facility] in [place] showed CR   2004 tumor RCC kidney removed.   Tries to be active,   Has bike, and is trying to ride more.   Review of Systems   All other systems reviewed and are negative.  Urination is back to normal    Objective   Physical Exam  Vitals reviewed.   Constitutional:       Appearance: Normal appearance.   HENT:      Head: Normocephalic and atraumatic.      Right Ear: Tympanic membrane normal.      Left Ear: Tympanic membrane normal.      Nose: Nose normal.      Mouth/Throat:      Mouth: Mucous membranes are moist.      Pharynx: Oropharynx is clear.   Eyes:      Extraocular Movements: Extraocular movements intact.      Conjunctiva/sclera: Conjunctivae normal.      Pupils: Pupils are equal, round, and reactive to light.   Cardiovascular:      Rate and Rhythm: Normal rate and regular rhythm.      Pulses: Normal pulses.      Heart sounds: Normal heart sounds.   Pulmonary:      Effort: Pulmonary effort is normal.      Breath sounds: Normal breath sounds.   Abdominal:      General: Abdomen is flat. Bowel sounds are normal.      Palpations: Abdomen is soft.   Musculoskeletal:         General: Normal range of motion.      Cervical back: Normal range of motion and neck supple.   Skin:     General: Skin is warm and dry.      Capillary Refill: Capillary refill takes less than 2 seconds.   Neurological:      General: No focal deficit present.      Mental Status: He is alert and  oriented to person, place, and time.   Psychiatric:         Mood and Affect: Mood normal.         Behavior: Behavior normal.         Assessment/Plan   Diagnoses and all orders for this visit:  Primary hypertension  -     CBC and Auto Differential; Future  -     Lipid Panel; Future  -     Comprehensive Metabolic Panel; Future  -     TSH with reflex to Free T4 if abnormal; Future  -     amLODIPine (Norvasc) 10 mg tablet; Take 1 tablet (10 mg) by mouth once daily.  -     atenolol (Tenormin) 25 mg tablet; Take 0.5 tablets (12.5 mg) by mouth once daily.  Screening for colon cancer  -     Cologuard® colon cancer screening; Future  History of renal cell carcinoma  History of kidney stones  Nocturia  -     Prostate Specific Antigen; Future  Screening for cardiovascular condition  -     CT cardiac scoring wo IV contrast; Future  Elevated serum creatinine

## 2024-11-11 ENCOUNTER — LAB (OUTPATIENT)
Dept: LAB | Facility: LAB | Age: 54
End: 2024-11-11
Payer: COMMERCIAL

## 2024-11-11 DIAGNOSIS — R35.1 NOCTURIA: ICD-10-CM

## 2024-11-11 DIAGNOSIS — R79.89 ELEVATED SERUM CREATININE: ICD-10-CM

## 2024-11-11 DIAGNOSIS — I10 PRIMARY HYPERTENSION: ICD-10-CM

## 2024-11-11 LAB
ALBUMIN SERPL BCP-MCNC: 4.4 G/DL (ref 3.4–5)
ALP SERPL-CCNC: 67 U/L (ref 33–120)
ALT SERPL W P-5'-P-CCNC: 29 U/L (ref 10–52)
ANION GAP SERPL CALC-SCNC: 14 MMOL/L (ref 10–20)
AST SERPL W P-5'-P-CCNC: 23 U/L (ref 9–39)
BASOPHILS # BLD AUTO: 0.07 X10*3/UL (ref 0–0.1)
BASOPHILS NFR BLD AUTO: 1.2 %
BILIRUB SERPL-MCNC: 0.7 MG/DL (ref 0–1.2)
BUN SERPL-MCNC: 18 MG/DL (ref 6–23)
CALCIUM SERPL-MCNC: 9.1 MG/DL (ref 8.6–10.3)
CHLORIDE SERPL-SCNC: 104 MMOL/L (ref 98–107)
CHOLEST SERPL-MCNC: 218 MG/DL (ref 0–199)
CHOLESTEROL/HDL RATIO: 5
CO2 SERPL-SCNC: 24 MMOL/L (ref 21–32)
CREAT SERPL-MCNC: 1.33 MG/DL (ref 0.5–1.3)
EGFRCR SERPLBLD CKD-EPI 2021: 64 ML/MIN/1.73M*2
EOSINOPHIL # BLD AUTO: 0.23 X10*3/UL (ref 0–0.7)
EOSINOPHIL NFR BLD AUTO: 3.8 %
ERYTHROCYTE [DISTWIDTH] IN BLOOD BY AUTOMATED COUNT: 11.9 % (ref 11.5–14.5)
GLUCOSE SERPL-MCNC: 115 MG/DL (ref 74–99)
HCT VFR BLD AUTO: 45.7 % (ref 41–52)
HDLC SERPL-MCNC: 44 MG/DL
HGB BLD-MCNC: 16 G/DL (ref 13.5–17.5)
IMM GRANULOCYTES # BLD AUTO: 0.03 X10*3/UL (ref 0–0.7)
IMM GRANULOCYTES NFR BLD AUTO: 0.5 % (ref 0–0.9)
LDLC SERPL CALC-MCNC: 153 MG/DL
LYMPHOCYTES # BLD AUTO: 1.46 X10*3/UL (ref 1.2–4.8)
LYMPHOCYTES NFR BLD AUTO: 24.1 %
MCH RBC QN AUTO: 31.2 PG (ref 26–34)
MCHC RBC AUTO-ENTMCNC: 35 G/DL (ref 32–36)
MCV RBC AUTO: 89 FL (ref 80–100)
MONOCYTES # BLD AUTO: 0.53 X10*3/UL (ref 0.1–1)
MONOCYTES NFR BLD AUTO: 8.7 %
NEUTROPHILS # BLD AUTO: 3.74 X10*3/UL (ref 1.2–7.7)
NEUTROPHILS NFR BLD AUTO: 61.7 %
NON HDL CHOLESTEROL: 174 MG/DL (ref 0–149)
NRBC BLD-RTO: 0 /100 WBCS (ref 0–0)
PLATELET # BLD AUTO: 292 X10*3/UL (ref 150–450)
POTASSIUM SERPL-SCNC: 4.5 MMOL/L (ref 3.5–5.3)
PROT SERPL-MCNC: 6.7 G/DL (ref 6.4–8.2)
PSA SERPL-MCNC: 1.39 NG/ML
RBC # BLD AUTO: 5.13 X10*6/UL (ref 4.5–5.9)
SODIUM SERPL-SCNC: 137 MMOL/L (ref 136–145)
TRIGL SERPL-MCNC: 106 MG/DL (ref 0–149)
TSH SERPL-ACNC: 3.37 MIU/L (ref 0.44–3.98)
VLDL: 21 MG/DL (ref 0–40)
WBC # BLD AUTO: 6.1 X10*3/UL (ref 4.4–11.3)

## 2024-11-11 PROCEDURE — 80053 COMPREHEN METABOLIC PANEL: CPT

## 2024-11-11 PROCEDURE — 84443 ASSAY THYROID STIM HORMONE: CPT

## 2024-11-11 PROCEDURE — 36415 COLL VENOUS BLD VENIPUNCTURE: CPT

## 2024-11-11 PROCEDURE — 85025 COMPLETE CBC W/AUTO DIFF WBC: CPT

## 2024-11-11 PROCEDURE — 84153 ASSAY OF PSA TOTAL: CPT

## 2024-11-11 PROCEDURE — 80061 LIPID PANEL: CPT

## 2024-11-24 DIAGNOSIS — Z90.5 SINGLE FUNCTIONAL KIDNEY: Primary | ICD-10-CM

## 2024-11-24 LAB — NONINV COLON CA DNA+OCC BLD SCRN STL QL: NEGATIVE

## 2024-12-02 NOTE — RESULT ENCOUNTER NOTE
Cologuard results were received and were reported as within normal limits. Thank you for submitting your test. Recheck three years.

## 2024-12-03 LAB
ATRIAL RATE: 72 BPM
P AXIS: -3 DEGREES
P OFFSET: 213 MS
P ONSET: 161 MS
PR INTERVAL: 128 MS
Q ONSET: 225 MS
QRS COUNT: 12 BEATS
QRS DURATION: 76 MS
QT INTERVAL: 406 MS
QTC CALCULATION(BAZETT): 444 MS
QTC FREDERICIA: 431 MS
R AXIS: 5 DEGREES
T AXIS: 187 DEGREES
T OFFSET: 428 MS
VENTRICULAR RATE: 72 BPM

## 2025-03-25 ENCOUNTER — HOSPITAL ENCOUNTER (OUTPATIENT)
Dept: RADIOLOGY | Facility: HOSPITAL | Age: 55
Discharge: HOME | End: 2025-03-25
Payer: COMMERCIAL

## 2025-03-25 DIAGNOSIS — Z13.6 SCREENING FOR CARDIOVASCULAR CONDITION: ICD-10-CM

## 2025-03-25 PROCEDURE — 75571 CT HRT W/O DYE W/CA TEST: CPT

## 2025-04-16 DIAGNOSIS — R93.1 ELEVATED CORONARY ARTERY CALCIUM SCORE: Primary | ICD-10-CM

## 2025-05-24 LAB
ANION GAP SERPL CALCULATED.4IONS-SCNC: 10 MMOL/L (CALC) (ref 7–17)
BUN SERPL-MCNC: 16 MG/DL (ref 7–25)
BUN/CREAT SERPL: ABNORMAL (CALC) (ref 6–22)
CALCIUM SERPL-MCNC: 9.2 MG/DL (ref 8.6–10.3)
CHLORIDE SERPL-SCNC: 103 MMOL/L (ref 98–110)
CO2 SERPL-SCNC: 23 MMOL/L (ref 20–32)
CREAT SERPL-MCNC: 1.02 MG/DL (ref 0.7–1.3)
EGFRCR SERPLBLD CKD-EPI 2021: 87 ML/MIN/1.73M2
GLUCOSE SERPL-MCNC: 125 MG/DL (ref 65–99)
POTASSIUM SERPL-SCNC: 4.7 MMOL/L (ref 3.5–5.3)
SODIUM SERPL-SCNC: 136 MMOL/L (ref 135–146)

## 2025-06-05 ENCOUNTER — OFFICE VISIT (OUTPATIENT)
Dept: CARDIOLOGY | Facility: HOSPITAL | Age: 55
End: 2025-06-05
Payer: COMMERCIAL

## 2025-06-05 VITALS
HEART RATE: 69 BPM | DIASTOLIC BLOOD PRESSURE: 90 MMHG | HEIGHT: 68 IN | SYSTOLIC BLOOD PRESSURE: 130 MMHG | WEIGHT: 240 LBS | BODY MASS INDEX: 36.37 KG/M2

## 2025-06-05 DIAGNOSIS — R93.1 ELEVATED CORONARY ARTERY CALCIUM SCORE: ICD-10-CM

## 2025-06-05 DIAGNOSIS — R94.31 ABNORMAL EKG: Primary | ICD-10-CM

## 2025-06-05 LAB
ATRIAL RATE: 69 BPM
P AXIS: 19 DEGREES
P OFFSET: 205 MS
P ONSET: 157 MS
PR INTERVAL: 126 MS
Q ONSET: 220 MS
QRS COUNT: 11 BEATS
QRS DURATION: 88 MS
QT INTERVAL: 384 MS
QTC CALCULATION(BAZETT): 411 MS
QTC FREDERICIA: 402 MS
R AXIS: -2 DEGREES
T AXIS: -40 DEGREES
T OFFSET: 412 MS
VENTRICULAR RATE: 69 BPM

## 2025-06-05 PROCEDURE — 3008F BODY MASS INDEX DOCD: CPT | Performed by: INTERNAL MEDICINE

## 2025-06-05 PROCEDURE — 1036F TOBACCO NON-USER: CPT | Performed by: INTERNAL MEDICINE

## 2025-06-05 PROCEDURE — 3075F SYST BP GE 130 - 139MM HG: CPT | Performed by: INTERNAL MEDICINE

## 2025-06-05 PROCEDURE — 99204 OFFICE O/P NEW MOD 45 MIN: CPT | Performed by: INTERNAL MEDICINE

## 2025-06-05 PROCEDURE — 3080F DIAST BP >= 90 MM HG: CPT | Performed by: INTERNAL MEDICINE

## 2025-06-05 PROCEDURE — 93005 ELECTROCARDIOGRAM TRACING: CPT | Performed by: INTERNAL MEDICINE

## 2025-06-05 RX ORDER — ROSUVASTATIN CALCIUM 20 MG/1
20 TABLET, COATED ORAL DAILY
Qty: 90 TABLET | Refills: 3 | Status: SHIPPED | OUTPATIENT
Start: 2025-06-05 | End: 2026-06-05

## 2025-06-05 ASSESSMENT — ENCOUNTER SYMPTOMS
LOSS OF SENSATION IN FEET: 0
OCCASIONAL FEELINGS OF UNSTEADINESS: 0
DEPRESSION: 0

## 2025-06-05 NOTE — PATIENT INSTRUCTIONS
Hypertension or High blood pressure is a condition that puts you at risk for heart attack, stroke, and kidney disease. It does not usually cause symptoms. But it can be serious.  Generally speaking, your target blood pressure is 130/80 or less. We encourage self-monitoring of blood pressure regularly at home, keeping a log and bringing it with you during doctors' visits.  You have a lot of control over your blood pressure. To lower it:  1) Lose weight (if you are overweight)  2)Choose a diet low in fat and rich in fruits, vegetables, and low-fat dairy products  3) Reduce the amount of salt you eat  4) Do something active for at least 30 minutes a day on most days of the week  5) Cut down on alcohol (if you drink more than 2 alcoholic drinks per day).  I (or your other doctors) may prescribe you medications to lower blood pressure. It is important that you do not stop these medications without speaking with us      Please do moderate physical activities for 45 minutes every day.  This could be biking or fast walking, jogging or swimming.  Try to do this at least 5 days a week.  If possible, and with isometric exercises for best results and weight loss.    Caloric reduction and eating right food is instrumental to weight loss.  In fact I would say that this is 80 to 90% of weight loss and would give you the biggest bang for your calvin.  You can use calorie watching application.  But also more importantly we would like you to stop taking any processed food.  Eat only unprocessed or minimally processed food.  Read labels on everything and avoid food with unfamiliar names or chemicals in it.  Try to reduce animal fat and increased plant-based protein to keep yourself full(examples include soy products, nuts, beans and legumes), and plant-based food that has high fiber content.

## 2025-06-05 NOTE — PROGRESS NOTES
"Chief Complaint:   New Patient Visit (Elevated CT scoring)     History Of Present Illness:    Jonatan Salas is a 54 y.o. male presenting for evaluation of elevated CT calcium score.  Calcium score was 409.6(2025).  Mr. Salas is rather asymptomatic but has a sedentary job as a .  He, however, stated that he put in a pool in his backyard all by himself recently.  No chest pain or shortness of breath.    Never smoker.  Family history pertinent for MI in both parents in their 50s.  Both parents were smokers.    His blood pressure has lately been better controlled he states being in the high 120s to 130s and diastolic in the 80s.    He is EKG shows sinus rhythm with nonspecific ST-T changes in multiple leads.     Last Recorded Vitals:  Vitals:    06/05/25 0946   BP: 130/90   BP Location: Left arm   Pulse: 69   Weight: 109 kg (240 lb)   Height: 1.727 m (5' 8\")       Past Medical History:  He has a past medical history of Accelerated hypertension (10/09/2024), RADHA (acute kidney injury) (10/09/2024), Left nephrolithiasis, and Renal cell carcinoma of right kidney (12/30/2005).    Past Surgical History:  He has a past surgical history that includes Nephrectomy (Right, 12/30/2005); Anterior cruciate ligament repair (Right); and Knee arthroscopy w/ MCL repair (Left).      Social History:  He reports that he has never smoked. He has never used smokeless tobacco. He reports that he does not drink alcohol and does not use drugs.    Family History:  Family History[1]     Allergies:  Penicillins and Amoxicillin    Outpatient Medications:  Current Outpatient Medications   Medication Instructions    amLODIPine (NORVASC) 10 mg, oral, Daily    atenolol (TENORMIN) 12.5 mg, oral, Daily       Physical Exam:  Physical Exam  Vitals reviewed.   Constitutional:       Appearance: Normal appearance.   Neck:      Vascular: No carotid bruit or JVD.   Cardiovascular:      Rate and Rhythm: Normal rate and regular rhythm.      Heart " "sounds: Normal heart sounds, S1 normal and S2 normal. No murmur heard.  Pulmonary:      Effort: Pulmonary effort is normal.      Breath sounds: Normal breath sounds.   Abdominal:      General: Abdomen is flat. Bowel sounds are normal.      Palpations: Abdomen is soft.   Musculoskeletal:      Right lower leg: No edema.      Left lower leg: No edema.   Skin:     General: Skin is warm.   Neurological:      Mental Status: He is alert. Mental status is at baseline.   Psychiatric:         Mood and Affect: Mood normal.         Behavior: Behavior normal.           Last Labs:  CBC -  Lab Results   Component Value Date    WBC 6.1 11/11/2024    HGB 16.0 11/11/2024    HCT 45.7 11/11/2024    MCV 89 11/11/2024     11/11/2024       CMP -  Lab Results   Component Value Date    CALCIUM 9.2 05/23/2025    PROT 6.7 11/11/2024    ALBUMIN 4.4 11/11/2024    AST 23 11/11/2024    ALT 29 11/11/2024    ALKPHOS 67 11/11/2024    BILITOT 0.7 11/11/2024       LIPID PANEL -   Lab Results   Component Value Date    CHOL 218 (H) 11/11/2024    TRIG 106 11/11/2024    HDL 44.0 11/11/2024    CHHDL 5.0 11/11/2024    VLDL 21 11/11/2024    NHDL 174 (H) 11/11/2024       RENAL FUNCTION PANEL -   Lab Results   Component Value Date    GLUCOSE 125 (H) 05/23/2025     05/23/2025    K 4.7 05/23/2025     05/23/2025    CO2 23 05/23/2025    ANIONGAP 10 05/23/2025    BUN 16 05/23/2025    CREATININE 1.02 05/23/2025    CALCIUM 9.2 05/23/2025    ALBUMIN 4.4 11/11/2024        Lab Results   Component Value Date    HGBA1C 5.4 10/09/2024       Last Cardiology Tests:  ECG:  ECG 12 Lead 10/09/2024      Echo:  No results found for this or any previous visit from the past 1095 days.      Ejection Fractions:  No results found for: \"EF\"    Cath:  No results found for this or any previous visit from the past 1095 days.      Stress Test:  No results found for this or any previous visit from the past 1095 days.      Cardiac Imaging:  CT cardiac scoring wo IV " contrast 03/25/2025          Assessment/Plan   1-elevated calcium score-by Fortune, 10-year risk of atherosclerotic vascular disease is about 19%.  Given high risk aggressive lipid-lowering is recommended along with other lifestyle modification and risk factor modification.  Start high-dose potent statin.  Target LDL less than 70 ideally close to 50.    2-Target blood pressure less than 130/80.  If not achieved with a combination of atenolol and amlodipine would recommend switching atenolol to an ACE inhibitor or ARB.  He was advised to follow-up with our [facility] with home blood pressure log and home blood pressure machine for calibration purposes in 3 months to see if adjustment would be necessary.    3-borderline abnormal ECG-given elevated calcium score, sedentary JOB, recommending stress test for ischemic evaluation prior to starting an exercise regimen.    4-other lifestyle changes discussed included diet, regular exercise that includes both resistance training as well as cardio, once stress test is done and is normal.  Ideal BMI closer to 25 and weight loss would be recommended as well to reduce future risk of cardiovascular events.          Manda Mckeon MD         [1]   Family History  Problem Relation Name Age of Onset    Hypertension Mother      Heart disease Mother      Hypertension Mother's Sister      Hearing loss Mother's Brother      Diabetes Maternal Grandfather

## 2025-08-25 DIAGNOSIS — R93.1 ELEVATED CORONARY ARTERY CALCIUM SCORE: ICD-10-CM

## 2025-08-28 LAB
CHOLEST SERPL-MCNC: 167 MG/DL
CHOLEST/HDLC SERPL: 4.1 (CALC)
HDLC SERPL-MCNC: 41 MG/DL
LDLC SERPL CALC-MCNC: 102 MG/DL (CALC)
NONHDLC SERPL-MCNC: 126 MG/DL (CALC)
TRIGL SERPL-MCNC: 144 MG/DL

## 2025-11-06 ENCOUNTER — APPOINTMENT (OUTPATIENT)
Dept: PRIMARY CARE | Facility: CLINIC | Age: 55
End: 2025-11-06
Payer: COMMERCIAL

## (undated) DEVICE — COLLECTION BAG, FLUID, NON-STERILE

## (undated) DEVICE — TUBING, SUCTION, CONNECTING, NON-CONDUCTIVE, SURE GRIP CONNECTORS, 3/16 IN X 10 FT

## (undated) DEVICE — Device

## (undated) DEVICE — STOPCOCK, SAN ANTONIO, W/MODIFIED FITTING

## (undated) DEVICE — IRRIGATION SET, CYSTOSCOPY, TURP, Y, CONTINUOUS, 81 IN

## (undated) DEVICE — PREP TRAY, SKIN, DRY, W/GLOVES

## (undated) DEVICE — SYRINGE, LUER LOCK, 12ML

## (undated) DEVICE — GUIDEWIRE, STRAIGHT, AMPLATZ SUPER STIFF, 0.035 IN X 180 CM

## (undated) DEVICE — TOWEL PACK, STERILE, 4/PACK, BLUE

## (undated) DEVICE — SYRINGE, 60 CC, IRRIGATION, PISTON, CATH TIP, W/LUER ADAPTER,DISP

## (undated) DEVICE — GUIDEWIRE, ULTRA TRACK, HYBRID, 0.035 IN X 150CM

## (undated) DEVICE — BASKET, STONE, RETRIEVAL, NITINOL (4WIRE, 1.9 0 TIP)

## (undated) DEVICE — MARKER, SKIN, DUAL TIP INK W/9 LABEL AND REMOVABLE TIME OUT SLEEVE